# Patient Record
Sex: FEMALE | Race: BLACK OR AFRICAN AMERICAN | NOT HISPANIC OR LATINO | Employment: UNEMPLOYED | RURAL
[De-identification: names, ages, dates, MRNs, and addresses within clinical notes are randomized per-mention and may not be internally consistent; named-entity substitution may affect disease eponyms.]

---

## 2019-07-15 ENCOUNTER — HISTORICAL (OUTPATIENT)
Dept: ADMINISTRATIVE | Facility: HOSPITAL | Age: 79
End: 2019-07-15

## 2019-07-16 LAB
LAB AP CLINICAL INFORMATION: NORMAL
LAB AP DIAGNOSIS - HISTORICAL: NORMAL
LAB AP GROSS PATHOLOGY - HISTORICAL: NORMAL
LAB AP SPECIMEN SUBMITTED - HISTORICAL: NORMAL

## 2020-05-06 ENCOUNTER — HISTORICAL (OUTPATIENT)
Dept: ADMINISTRATIVE | Facility: HOSPITAL | Age: 80
End: 2020-05-06

## 2020-05-06 LAB
ALBUMIN SERPL BCP-MCNC: 3.9 G/DL (ref 3.5–5)
ALP SERPL-CCNC: 79 U/L (ref 55–142)
ALT SERPL W P-5'-P-CCNC: 9 U/L (ref 13–56)
AST SERPL W P-5'-P-CCNC: 16 U/L (ref 15–37)
BILIRUB DIRECT SERPL-MCNC: 0.1 MG/DL (ref 0–0.2)
BILIRUB SERPL-MCNC: 0.3 MG/DL (ref 0–1.2)
BUN SERPL-MCNC: 16 MG/DL (ref 7–18)
CALCIUM SERPL-MCNC: 9.3 MG/DL (ref 8.5–10.1)
CHLORIDE SERPL-SCNC: 99 MMOL/L (ref 98–107)
CO2 SERPL-SCNC: 30 MMOL/L (ref 21–32)
CREAT SERPL-MCNC: 0.86 MG/DL (ref 0.5–1.02)
EST. AVERAGE GLUCOSE BLD GHB EST-MCNC: 180 MG/DL
GLUCOSE SERPL-MCNC: 152 MG/DL (ref 74–106)
HBA1C MFR BLD HPLC: 8 % (ref 4.5–6.6)
POTASSIUM SERPL-SCNC: 3.5 MMOL/L (ref 3.5–5.1)
PROT SERPL-MCNC: 8 G/DL (ref 6.4–8.2)
SODIUM SERPL-SCNC: 138 MMOL/L (ref 136–145)

## 2020-08-05 ENCOUNTER — HISTORICAL (OUTPATIENT)
Dept: ADMINISTRATIVE | Facility: HOSPITAL | Age: 80
End: 2020-08-05

## 2020-08-05 LAB
ANION GAP SERPL CALCULATED.3IONS-SCNC: 8.9 MMOL/L (ref 7–16)
BUN SERPL-MCNC: 15 MG/DL (ref 7–18)
CALCIUM SERPL-MCNC: 9.6 MG/DL (ref 8.5–10.1)
CHLORIDE SERPL-SCNC: 102 MMOL/L (ref 98–107)
CO2 SERPL-SCNC: 33 MMOL/L (ref 21–32)
CREAT SERPL-MCNC: 0.89 MG/DL (ref 0.5–1.02)
EST. AVERAGE GLUCOSE BLD GHB EST-MCNC: 167 MG/DL
GLUCOSE SERPL-MCNC: 170 MG/DL (ref 74–106)
HBA1C MFR BLD HPLC: 7.6 %
POTASSIUM SERPL-SCNC: 3.9 MMOL/L (ref 3.5–5.1)
SARS-COV+SARS-COV-2 AG RESP QL IA.RAPID: NEGATIVE
SODIUM SERPL-SCNC: 140 MMOL/L (ref 136–145)

## 2020-08-07 LAB
REPORT: NORMAL

## 2020-09-23 ENCOUNTER — HISTORICAL (OUTPATIENT)
Dept: ADMINISTRATIVE | Facility: HOSPITAL | Age: 80
End: 2020-09-23

## 2020-09-24 LAB
ALBUMIN SERPL BCP-MCNC: 4.1 G/DL (ref 3.5–5)
ALP SERPL-CCNC: 51 U/L (ref 55–142)
ALT SERPL W P-5'-P-CCNC: 35 U/L (ref 13–56)
AST SERPL W P-5'-P-CCNC: 21 U/L (ref 15–37)
BILIRUB DIRECT SERPL-MCNC: 0.1 MG/DL (ref 0–0.2)
BILIRUB SERPL-MCNC: 0.3 MG/DL (ref 0–1.2)
CHOLEST SERPL-MCNC: 150 MG/DL
CHOLEST/HDLC SERPL: 2.8 {RATIO}
HDLC SERPL-MCNC: 54 MG/DL
LDLC SERPL CALC-MCNC: 72 MG/DL
PROT SERPL-MCNC: 7.6 G/DL (ref 6.4–8.2)
TRIGL SERPL-MCNC: 119 MG/DL

## 2021-01-02 ENCOUNTER — HISTORICAL (OUTPATIENT)
Dept: ADMINISTRATIVE | Facility: HOSPITAL | Age: 81
End: 2021-01-02

## 2021-01-02 LAB — GLUCOSE SERPL-MCNC: 119 MG/DL (ref 70–105)

## 2021-01-04 ENCOUNTER — HISTORICAL (OUTPATIENT)
Dept: ADMINISTRATIVE | Facility: HOSPITAL | Age: 81
End: 2021-01-04

## 2021-01-04 LAB
EST. AVERAGE GLUCOSE BLD GHB EST-MCNC: 160 MG/DL
HBA1C MFR BLD HPLC: 7.4 % (ref 4.5–6.6)
SARS-COV+SARS-COV-2 AG RESP QL IA.RAPID: NEGATIVE

## 2021-01-06 LAB
REPORT: NORMAL

## 2021-01-18 ENCOUNTER — HISTORICAL (OUTPATIENT)
Dept: ADMINISTRATIVE | Facility: HOSPITAL | Age: 81
End: 2021-01-18

## 2021-01-18 LAB
ANION GAP SERPL CALCULATED.3IONS-SCNC: 11 MMOL/L (ref 7–16)
BASOPHILS # BLD AUTO: 0.04 X10E3/UL (ref 0–0.2)
BASOPHILS NFR BLD AUTO: 0.7 % (ref 0–1)
BUN SERPL-MCNC: 19 MG/DL (ref 7–18)
CALCIUM SERPL-MCNC: 9.5 MG/DL (ref 8.5–10.1)
CHLORIDE SERPL-SCNC: 101 MMOL/L (ref 98–107)
CO2 SERPL-SCNC: 30 MMOL/L (ref 21–32)
CREAT SERPL-MCNC: 0.89 MG/DL (ref 0.5–1.02)
EOSINOPHIL # BLD AUTO: 0.1 X10E3/UL (ref 0–0.5)
EOSINOPHIL NFR BLD AUTO: 1.7 % (ref 1–4)
ERYTHROCYTE [DISTWIDTH] IN BLOOD BY AUTOMATED COUNT: 13.5 % (ref 11.5–14.5)
EST. AVERAGE GLUCOSE BLD GHB EST-MCNC: 157 MG/DL
GLUCOSE SERPL-MCNC: 128 MG/DL (ref 74–106)
HBA1C MFR BLD HPLC: 7.3 %
HCT VFR BLD AUTO: 37.4 % (ref 38–47)
HGB BLD-MCNC: 12.2 G/DL (ref 12–16)
IMM GRANULOCYTES # BLD AUTO: 0.02 X10E3/UL (ref 0–0.04)
IMM GRANULOCYTES NFR BLD: 0.3 % (ref 0–0.4)
LYMPHOCYTES # BLD AUTO: 2.42 X10E3/UL (ref 1–4.8)
LYMPHOCYTES NFR BLD AUTO: 39.9 % (ref 27–41)
MCH RBC QN AUTO: 30.9 PG (ref 27–31)
MCHC RBC AUTO-ENTMCNC: 32.6 G/DL (ref 32–36)
MCV RBC AUTO: 94.7 FL (ref 80–96)
MONOCYTES # BLD AUTO: 0.5 X10E3/UL (ref 0–0.8)
MONOCYTES NFR BLD AUTO: 8.3 % (ref 2–6)
MPC BLD CALC-MCNC: 11.1 FL (ref 9.4–12.4)
NEUTROPHILS # BLD AUTO: 2.98 X10E3/UL (ref 1.8–7.7)
NEUTROPHILS NFR BLD AUTO: 49.1 % (ref 53–65)
NRBC # BLD AUTO: 0 X10E3/UL (ref 0–0)
NRBC, AUTO (.00): 0 /100 (ref 0–0)
PLATELET # BLD AUTO: 305 X10E3/UL (ref 150–400)
POTASSIUM SERPL-SCNC: 3.5 MMOL/L (ref 3.5–5.1)
RBC # BLD AUTO: 3.95 X10E6/UL (ref 4.2–5.4)
SODIUM SERPL-SCNC: 138 MMOL/L (ref 136–145)
TSH SERPL DL<=0.005 MIU/L-ACNC: 4.36 UIU/ML (ref 0.36–3.74)
WBC # BLD AUTO: 6.06 X10E3/UL (ref 4.5–11)

## 2021-01-20 LAB
REPORT: NORMAL

## 2021-02-01 ENCOUNTER — HISTORICAL (OUTPATIENT)
Dept: ADMINISTRATIVE | Facility: HOSPITAL | Age: 81
End: 2021-02-01

## 2021-02-01 LAB — SARS-COV+SARS-COV-2 AG RESP QL IA.RAPID: NEGATIVE

## 2021-02-03 LAB
REPORT: NORMAL

## 2021-03-04 ENCOUNTER — HISTORICAL (OUTPATIENT)
Dept: ADMINISTRATIVE | Facility: HOSPITAL | Age: 81
End: 2021-03-04

## 2021-04-21 ENCOUNTER — HISTORICAL (OUTPATIENT)
Dept: ADMINISTRATIVE | Facility: HOSPITAL | Age: 81
End: 2021-04-21

## 2021-04-21 LAB
EST. AVERAGE GLUCOSE BLD GHB EST-MCNC: 184 MG/DL
HBA1C MFR BLD HPLC: 8.1 % (ref 4.5–6.6)

## 2022-03-10 ENCOUNTER — HOSPITAL ENCOUNTER (OUTPATIENT)
Dept: RADIOLOGY | Facility: HOSPITAL | Age: 82
Discharge: HOME | End: 2022-03-10
Payer: COMMERCIAL

## 2022-03-10 VITALS — HEIGHT: 63 IN | BODY MASS INDEX: 30.12 KG/M2 | WEIGHT: 170 LBS

## 2022-03-10 DIAGNOSIS — Z12.31 VISIT FOR SCREENING MAMMOGRAM: ICD-10-CM

## 2022-03-10 PROCEDURE — 77067 MAMMO DIGITAL SCREENING LEFT: ICD-10-PCS | Mod: 26,52,, | Performed by: RADIOLOGY

## 2022-03-10 PROCEDURE — 77067 SCR MAMMO BI INCL CAD: CPT | Mod: TC

## 2022-03-10 PROCEDURE — 77067 SCR MAMMO BI INCL CAD: CPT | Mod: 26,52,, | Performed by: RADIOLOGY

## 2023-02-18 ENCOUNTER — HOSPITAL ENCOUNTER (EMERGENCY)
Facility: HOSPITAL | Age: 83
Discharge: HOME | End: 2023-02-18
Attending: FAMILY MEDICINE
Payer: COMMERCIAL

## 2023-02-18 VITALS
DIASTOLIC BLOOD PRESSURE: 76 MMHG | BODY MASS INDEX: 29.05 KG/M2 | OXYGEN SATURATION: 95 % | HEART RATE: 70 BPM | WEIGHT: 164 LBS | RESPIRATION RATE: 18 BRPM | TEMPERATURE: 98 F | SYSTOLIC BLOOD PRESSURE: 143 MMHG

## 2023-02-18 DIAGNOSIS — K64.9 HEMORRHOIDS, UNSPECIFIED HEMORRHOID TYPE: ICD-10-CM

## 2023-02-18 DIAGNOSIS — K59.00 CONSTIPATION, UNSPECIFIED CONSTIPATION TYPE: Primary | ICD-10-CM

## 2023-02-18 LAB
ALBUMIN SERPL BCP-MCNC: 4.1 G/DL (ref 3.5–5)
ALBUMIN/GLOB SERPL: 1.2 {RATIO}
ALP SERPL-CCNC: 65 U/L (ref 55–142)
ALT SERPL W P-5'-P-CCNC: 26 U/L (ref 13–56)
ANION GAP SERPL CALCULATED.3IONS-SCNC: 15 MMOL/L (ref 7–16)
AST SERPL W P-5'-P-CCNC: 20 U/L (ref 15–37)
BASOPHILS # BLD AUTO: 0.01 K/UL (ref 0–0.2)
BASOPHILS NFR BLD AUTO: 0.1 % (ref 0–1)
BILIRUB SERPL-MCNC: 0.4 MG/DL (ref ?–1.2)
BILIRUB UR QL STRIP: NEGATIVE
BUN SERPL-MCNC: 28 MG/DL (ref 7–18)
BUN/CREAT SERPL: 22 (ref 6–20)
CALCIUM SERPL-MCNC: 9.5 MG/DL (ref 8.5–10.1)
CHLORIDE SERPL-SCNC: 101 MMOL/L (ref 98–107)
CLARITY UR: CLEAR
CO2 SERPL-SCNC: 30 MMOL/L (ref 21–32)
COLOR UR: YELLOW
CREAT SERPL-MCNC: 1.3 MG/DL (ref 0.55–1.02)
DIFFERENTIAL METHOD BLD: ABNORMAL
EGFR (NO RACE VARIABLE) (RUSH/TITUS): 42 ML/MIN/1.73M²
EOSINOPHIL # BLD AUTO: 0.09 K/UL (ref 0–0.5)
EOSINOPHIL NFR BLD AUTO: 1.2 % (ref 1–4)
ERYTHROCYTE [DISTWIDTH] IN BLOOD BY AUTOMATED COUNT: 15.1 % (ref 11.5–14.5)
GLOBULIN SER-MCNC: 3.4 G/DL (ref 2–4)
GLUCOSE SERPL-MCNC: 122 MG/DL (ref 74–106)
GLUCOSE SERPL-MCNC: 149 MG/DL (ref 70–105)
GLUCOSE UR STRIP-MCNC: 500 MG/DL
HCT VFR BLD AUTO: 39.8 % (ref 38–47)
HGB BLD-MCNC: 12.7 G/DL (ref 12–16)
IMM GRANULOCYTES # BLD AUTO: 0.01 K/UL (ref 0–0.04)
IMM GRANULOCYTES NFR BLD: 0.1 % (ref 0–0.4)
KETONES UR STRIP-SCNC: ABNORMAL MG/DL
LEUKOCYTE ESTERASE UR QL STRIP: NEGATIVE
LYMPHOCYTES # BLD AUTO: 0.83 K/UL (ref 1–4.8)
LYMPHOCYTES NFR BLD AUTO: 11.3 % (ref 27–41)
MAGNESIUM SERPL-MCNC: 1.7 MG/DL (ref 1.7–2.3)
MCH RBC QN AUTO: 29.7 PG (ref 27–31)
MCHC RBC AUTO-ENTMCNC: 31.9 G/DL (ref 32–36)
MCV RBC AUTO: 93.2 FL (ref 80–96)
MONOCYTES # BLD AUTO: 0.36 K/UL (ref 0–0.8)
MONOCYTES NFR BLD AUTO: 4.9 % (ref 2–6)
MPC BLD CALC-MCNC: 10 FL (ref 9.4–12.4)
NEUTROPHILS # BLD AUTO: 6.06 K/UL (ref 1.8–7.7)
NEUTROPHILS NFR BLD AUTO: 82.4 % (ref 53–65)
NITRITE UR QL STRIP: NEGATIVE
PH UR STRIP: 6 PH UNITS
PLATELET # BLD AUTO: 284 K/UL (ref 150–400)
POTASSIUM SERPL-SCNC: 3.9 MMOL/L (ref 3.5–5.1)
PROT SERPL-MCNC: 7.5 G/DL (ref 6.4–8.2)
PROT UR QL STRIP: NEGATIVE
RBC # BLD AUTO: 4.27 M/UL (ref 4.2–5.4)
RBC # UR STRIP: NEGATIVE /UL
SODIUM SERPL-SCNC: 142 MMOL/L (ref 136–145)
SP GR UR STRIP: 1.01
UROBILINOGEN UR STRIP-ACNC: 0.2 MG/DL
WBC # BLD AUTO: 7.36 K/UL (ref 4.5–11)

## 2023-02-18 PROCEDURE — 83735 ASSAY OF MAGNESIUM: CPT | Performed by: FAMILY MEDICINE

## 2023-02-18 PROCEDURE — 82962 GLUCOSE BLOOD TEST: CPT

## 2023-02-18 PROCEDURE — 25000003 PHARM REV CODE 250: Performed by: FAMILY MEDICINE

## 2023-02-18 PROCEDURE — 80053 COMPREHEN METABOLIC PANEL: CPT | Performed by: FAMILY MEDICINE

## 2023-02-18 PROCEDURE — 99284 EMERGENCY DEPT VISIT MOD MDM: CPT

## 2023-02-18 PROCEDURE — 99284 EMERGENCY DEPT VISIT MOD MDM: CPT | Performed by: FAMILY MEDICINE

## 2023-02-18 PROCEDURE — 85025 COMPLETE CBC W/AUTO DIFF WBC: CPT | Performed by: FAMILY MEDICINE

## 2023-02-18 PROCEDURE — 81003 URINALYSIS AUTO W/O SCOPE: CPT | Performed by: FAMILY MEDICINE

## 2023-02-18 RX ORDER — DOCUSATE SODIUM 100 MG/1
100 CAPSULE, LIQUID FILLED ORAL 2 TIMES DAILY
Qty: 60 CAPSULE | Refills: 0 | Status: SHIPPED | OUTPATIENT
Start: 2023-02-18 | End: 2023-11-20

## 2023-02-18 RX ORDER — METOPROLOL SUCCINATE 50 MG/1
50 TABLET, EXTENDED RELEASE ORAL 2 TIMES DAILY
COMMUNITY
Start: 2023-01-13

## 2023-02-18 RX ORDER — DOXYCYCLINE 100 MG/1
100 CAPSULE ORAL 2 TIMES DAILY
COMMUNITY
Start: 2023-01-30 | End: 2023-11-20

## 2023-02-18 RX ORDER — FLUOXETINE HYDROCHLORIDE 20 MG/1
20 CAPSULE ORAL
COMMUNITY
Start: 2023-01-30

## 2023-02-18 RX ORDER — FLUTICASONE PROPIONATE 50 MCG
SPRAY, SUSPENSION (ML) NASAL
COMMUNITY
Start: 2022-09-27

## 2023-02-18 RX ORDER — AMLODIPINE BESYLATE 5 MG/1
5 TABLET ORAL
COMMUNITY
Start: 2023-01-16

## 2023-02-18 RX ORDER — SYRING-NEEDL,DISP,INSUL,0.3 ML 29 G X1/2"
296 SYRINGE, EMPTY DISPOSABLE MISCELLANEOUS
Status: DISCONTINUED | OUTPATIENT
Start: 2023-02-18 | End: 2023-02-18

## 2023-02-18 RX ORDER — METFORMIN HYDROCHLORIDE 500 MG/1
1000 TABLET ORAL 2 TIMES DAILY
COMMUNITY
Start: 2023-01-13

## 2023-02-18 RX ORDER — HYDROCHLOROTHIAZIDE 12.5 MG/1
12.5 CAPSULE ORAL
COMMUNITY
Start: 2023-01-13 | End: 2023-11-20

## 2023-02-18 RX ORDER — CARBIDOPA AND LEVODOPA 25; 100 MG/1; MG/1
1 TABLET ORAL 3 TIMES DAILY
COMMUNITY
Start: 2023-01-13

## 2023-02-18 RX ORDER — LEVOCETIRIZINE DIHYDROCHLORIDE 5 MG/1
5 TABLET, FILM COATED ORAL
COMMUNITY
Start: 2022-12-20

## 2023-02-18 RX ORDER — ADHESIVE BANDAGE
30 BANDAGE TOPICAL
Status: COMPLETED | OUTPATIENT
Start: 2023-02-18 | End: 2023-02-18

## 2023-02-18 RX ORDER — EMPAGLIFLOZIN 10 MG/1
10 TABLET, FILM COATED ORAL
COMMUNITY
Start: 2023-01-13 | End: 2023-10-19

## 2023-02-18 RX ORDER — POLYETHYLENE GLYCOL 3350 17 G/17G
17 POWDER, FOR SOLUTION ORAL 2 TIMES DAILY PRN
Qty: 30 PACKET | Refills: 0 | Status: SHIPPED | OUTPATIENT
Start: 2023-02-18 | End: 2023-11-20

## 2023-02-18 RX ORDER — ESOMEPRAZOLE MAGNESIUM 40 MG/1
40 CAPSULE, DELAYED RELEASE ORAL
COMMUNITY
Start: 2023-01-13 | End: 2023-10-30 | Stop reason: ALTCHOICE

## 2023-02-18 RX ORDER — GLIPIZIDE 10 MG/1
10 TABLET, FILM COATED, EXTENDED RELEASE ORAL 2 TIMES DAILY
COMMUNITY
Start: 2022-11-21

## 2023-02-18 RX ORDER — HYDROCORTISONE 25 MG/G
OINTMENT TOPICAL 2 TIMES DAILY
Qty: 30 G | Refills: 0 | Status: SHIPPED | OUTPATIENT
Start: 2023-02-18 | End: 2023-02-23

## 2023-02-18 RX ORDER — ATORVASTATIN CALCIUM 20 MG/1
20 TABLET, FILM COATED ORAL
COMMUNITY
Start: 2023-01-13

## 2023-02-18 RX ADMIN — MAGNESIUM HYDROXIDE 2400 MG: 2400 SUSPENSION ORAL at 05:02

## 2023-02-18 NOTE — ED PROVIDER NOTES
Encounter Date: 2/18/2023       History     Chief Complaint   Patient presents with    Constipation    Back Pain     Mrs Owens is a 82-year-old female who presented to the ED complaining of constipation for 4 days.  Patient reports that she have been diagnosed with pneumonia 2 weeks ago and was taking antibiotics.  Patient denies fever, denies chest pain, shortness of breath, denies nausea vomiting or diarrhea.  Patient denies dysuria or hematuria.      Review of patient's allergies indicates:   Allergen Reactions    Asa [aspirin] Itching    Latex, natural rubber Hives    Sulfa (sulfonamide antibiotics) Itching    Pcn [penicillins] Rash     Past Medical History:   Diagnosis Date    Breast cancer     CHF (congestive heart failure)     Coronary artery disease     Depression     Diabetes mellitus     High blood cholesterol     Hypertension     Parkinson's disease      Past Surgical History:   Procedure Laterality Date    HYSTERECTOMY      MASTECTOMY Right     OOPHORECTOMY       Family History   Problem Relation Age of Onset    Breast cancer Maternal Aunt      Social History     Tobacco Use    Smoking status: Never    Smokeless tobacco: Never   Substance Use Topics    Alcohol use: Never    Drug use: Never     Review of Systems   Constitutional:  Negative for fever.   HENT:  Negative for sore throat.    Respiratory:  Negative for shortness of breath.    Cardiovascular:  Negative for chest pain.   Gastrointestinal:  Positive for constipation. Negative for nausea.   Genitourinary:  Negative for dysuria.   Musculoskeletal:  Negative for back pain.   Skin:  Negative for rash.   Neurological:  Negative for weakness.   Hematological:  Does not bruise/bleed easily.     Physical Exam     Initial Vitals [02/18/23 1706]   BP Pulse Resp Temp SpO2   (!) 133/45 81 18 98.3 °F (36.8 °C) 95 %      MAP       --         Physical Exam    Constitutional: She appears well-developed.   HENT:   Head: Normocephalic and atraumatic.   Eyes: EOM  are normal. Pupils are equal, round, and reactive to light.   Neck: Neck supple.   Cardiovascular:  Normal rate, regular rhythm and normal heart sounds.           Pulmonary/Chest: Breath sounds normal. No respiratory distress. She has no wheezes. She has no rhonchi. She has no rales.   Abdominal: Abdomen is soft. Bowel sounds are normal. She exhibits distension. There is no abdominal tenderness. There is no rebound and no guarding.   Musculoskeletal:         General: Normal range of motion.      Cervical back: Neck supple.     Neurological: She is alert and oriented to person, place, and time.   Skin: Skin is warm. Capillary refill takes less than 2 seconds.   Psychiatric: She has a normal mood and affect.       Medical Screening Exam   See Full Note    ED Course   Procedures  Labs Reviewed   COMPREHENSIVE METABOLIC PANEL - Abnormal; Notable for the following components:       Result Value    Glucose 122 (*)     BUN 28 (*)     Creatinine 1.30 (*)     BUN/Creatinine Ratio 22 (*)     eGFR 42 (*)     All other components within normal limits   URINALYSIS, REFLEX TO URINE CULTURE - Abnormal; Notable for the following components:    Glucose,  (*)     All other components within normal limits   CBC WITH DIFFERENTIAL - Abnormal; Notable for the following components:    MCHC 31.9 (*)     RDW 15.1 (*)     Neutrophils % 82.4 (*)     Lymphocytes % 11.3 (*)     Lymphocytes, Absolute 0.83 (*)     All other components within normal limits   POCT GLUCOSE MONITORING CONTINUOUS - Abnormal; Notable for the following components:    POC Glucose 149 (*)     All other components within normal limits   MAGNESIUM - Normal   CBC W/ AUTO DIFFERENTIAL    Narrative:     The following orders were created for panel order CBC Auto Differential.  Procedure                               Abnormality         Status                     ---------                               -----------         ------                     CBC with  Differential[[id number]]        Abnormal            Final result                 Please view results for these tests on the individual orders.          Imaging Results              XR ABDOMEN, ACUTE 2 OR MORE VIEWS WITH CHEST (Final result)  Result time 02/18/23 17:54:18      Final result by Favian Gonzalez MD (02/18/23 17:54:18)                   Impression:      Moderate colonic stool.      Electronically signed by: Favian Gonzalez  Date:    02/18/2023  Time:    17:54               Narrative:    EXAMINATION:  XR ABDOMEN ACUTE 2 OR MORE VIEWS WITH CHEST    CLINICAL HISTORY:  abdominal pain;    TECHNIQUE:  PA chest radiograph, supine and erect views of the abdomen    COMPARISON:  None    FINDINGS:  Surgical clips seen of the right breast and axilla.  The lungs appear clear.  There are a few air-fluid levels seen of the bowel.  Moderate degree of colonic stool.  No bowel obstruction.  No pneumoperitoneum.                                       Medications   magnesium hydroxide 400 mg/5 ml suspension 2,400 mg (2,400 mg Oral Given 2/18/23 1822)     Medical Decision Making:   Clinical Tests:   Lab Tests: Ordered and Reviewed  Radiological Study: Ordered and Reviewed  ED Management:  MDM    Patient presents for emergent evaluation of acute constipation   In the ED patient found to have acute constipation.    I ordered labs and personally reviewed them.  Labs significant for No acute abnormality  I ordered X-rays and personally reviewed them and reviewed the radiologist interpretation.  Xray significant for Increased stool burden.        Discharge MDM  I discussed the patient presentation labs X-rays,  findings with the patient  Patient was managed in the ED with po milk of magnesium  The response to treatment was adequate.    Patient was discharged in stable condition.  Detailed return precautions discussed.                  Clinical Impression:   Final diagnoses:  [K59.00] Constipation, unspecified constipation type  (Primary)  [K64.9] Hemorrhoids, unspecified hemorrhoid type        ED Disposition Condition    Discharge Stable          ED Prescriptions       Medication Sig Dispense Start Date End Date Auth. Provider    polyethylene glycol (GLYCOLAX) 17 gram PwPk Take 17 g by mouth 2 (two) times daily as needed (constipation). 30 packet 2/18/2023 -- Jacey River MD    docusate sodium (COLACE) 100 MG capsule Take 1 capsule (100 mg total) by mouth 2 (two) times daily. 60 capsule 2/18/2023 -- Jacey River MD    hydrocortisone 2.5 % ointment Apply topically 2 (two) times daily. For hemorrhoids for 5 days 30 g 2/18/2023 2/23/2023 Jacey River MD          Follow-up Information    None          Jacey River MD  02/18/23 0950

## 2023-02-18 NOTE — ED TRIAGE NOTES
Pt states she has been constipated for 2-3 days and her hemorrhoids are acting up. Pt states she is having abd pain back pain and sun side pain. Pt states pain is a 10 on 0-10 scale. Pt states she has been taking some sinus medications and has been diagnosed with pneumonia last month.

## 2023-06-27 ENCOUNTER — HOSPITAL ENCOUNTER (OUTPATIENT)
Dept: RADIOLOGY | Facility: HOSPITAL | Age: 83
Discharge: HOME | End: 2023-06-27
Attending: RADIOLOGY
Payer: COMMERCIAL

## 2023-06-27 ENCOUNTER — HOSPITAL ENCOUNTER (OUTPATIENT)
Dept: RADIOLOGY | Facility: HOSPITAL | Age: 83
Discharge: HOME | End: 2023-06-27
Payer: COMMERCIAL

## 2023-06-27 DIAGNOSIS — R92.8 ABNORMAL MAMMOGRAM: ICD-10-CM

## 2023-06-27 DIAGNOSIS — Z12.31 VISIT FOR SCREENING MAMMOGRAM: ICD-10-CM

## 2023-06-27 PROCEDURE — 77067 SCR MAMMO BI INCL CAD: CPT | Mod: 26,52,, | Performed by: RADIOLOGY

## 2023-06-27 PROCEDURE — 77067 SCR MAMMO BI INCL CAD: CPT | Mod: TC,52

## 2023-06-27 PROCEDURE — 76642 ULTRASOUND BREAST LIMITED: CPT | Mod: 26,59,RT, | Performed by: RADIOLOGY

## 2023-06-27 PROCEDURE — 76642 PR US BREAST UNILAT LIMITED: ICD-10-PCS | Mod: 26,59,RT, | Performed by: RADIOLOGY

## 2023-06-27 PROCEDURE — 76641 ULTRASOUND BREAST COMPLETE: CPT | Mod: TC,50

## 2023-06-27 PROCEDURE — 76641 ULTRASOUND BREAST COMPLETE: CPT | Mod: 26,LT,, | Performed by: RADIOLOGY

## 2023-06-27 PROCEDURE — 77067 MAMMO DIGITAL SCREENING LEFT: ICD-10-PCS | Mod: 26,52,, | Performed by: RADIOLOGY

## 2023-06-27 PROCEDURE — 76641 US BREAST BILATERAL COMPLETE: ICD-10-PCS | Mod: 26,LT,, | Performed by: RADIOLOGY

## 2023-10-19 ENCOUNTER — HOSPITAL ENCOUNTER (EMERGENCY)
Facility: HOSPITAL | Age: 83
Discharge: HOME | End: 2023-10-19
Attending: INTERNAL MEDICINE
Payer: COMMERCIAL

## 2023-10-19 VITALS
HEART RATE: 70 BPM | OXYGEN SATURATION: 100 % | SYSTOLIC BLOOD PRESSURE: 143 MMHG | WEIGHT: 153.38 LBS | TEMPERATURE: 99 F | DIASTOLIC BLOOD PRESSURE: 70 MMHG | BODY MASS INDEX: 27.18 KG/M2 | RESPIRATION RATE: 20 BRPM | HEIGHT: 63 IN

## 2023-10-19 DIAGNOSIS — K92.2 GI BLEED: Primary | ICD-10-CM

## 2023-10-19 DIAGNOSIS — K64.0 GRADE I HEMORRHOIDS: ICD-10-CM

## 2023-10-19 DIAGNOSIS — I10 HYPERTENSION, UNSPECIFIED TYPE: ICD-10-CM

## 2023-10-19 DIAGNOSIS — K59.00 CONSTIPATION, UNSPECIFIED CONSTIPATION TYPE: ICD-10-CM

## 2023-10-19 LAB
ALBUMIN SERPL BCP-MCNC: 4 G/DL (ref 3.5–5)
ALBUMIN/GLOB SERPL: 1.3 {RATIO}
ALP SERPL-CCNC: 74 U/L (ref 55–142)
ALT SERPL W P-5'-P-CCNC: 9 U/L (ref 13–56)
ANION GAP SERPL CALCULATED.3IONS-SCNC: 13 MMOL/L (ref 7–16)
APTT PPP: 26.9 SECONDS (ref 25.2–37.3)
AST SERPL W P-5'-P-CCNC: 20 U/L (ref 15–37)
BASOPHILS # BLD AUTO: 0.03 K/UL (ref 0–0.2)
BASOPHILS NFR BLD AUTO: 0.3 % (ref 0–1)
BILIRUB SERPL-MCNC: 0.6 MG/DL (ref ?–1.2)
BUN SERPL-MCNC: 18 MG/DL (ref 7–18)
BUN/CREAT SERPL: 17 (ref 6–20)
CALCIUM SERPL-MCNC: 9 MG/DL (ref 8.5–10.1)
CHLORIDE SERPL-SCNC: 103 MMOL/L (ref 98–107)
CO2 SERPL-SCNC: 27 MMOL/L (ref 21–32)
CREAT SERPL-MCNC: 1.07 MG/DL (ref 0.55–1.02)
DIFFERENTIAL METHOD BLD: ABNORMAL
EGFR (NO RACE VARIABLE) (RUSH/TITUS): 53 ML/MIN/1.73M2
EOSINOPHIL # BLD AUTO: 0.05 K/UL (ref 0–0.5)
EOSINOPHIL NFR BLD AUTO: 0.4 % (ref 1–4)
ERYTHROCYTE [DISTWIDTH] IN BLOOD BY AUTOMATED COUNT: 13.7 % (ref 11.5–14.5)
GLOBULIN SER-MCNC: 3 G/DL (ref 2–4)
GLUCOSE SERPL-MCNC: 204 MG/DL (ref 70–105)
GLUCOSE SERPL-MCNC: 208 MG/DL (ref 74–106)
HCT VFR BLD AUTO: 33.5 % (ref 38–47)
HGB BLD-MCNC: 11 G/DL (ref 12–16)
IMM GRANULOCYTES # BLD AUTO: 0.04 K/UL (ref 0–0.04)
IMM GRANULOCYTES NFR BLD: 0.4 % (ref 0–0.4)
IMM RETICS NFR: 13.8 % (ref 3–15.9)
INR BLD: 1.12
LIPASE SERPL-CCNC: 87 U/L (ref 73–393)
LYMPHOCYTES # BLD AUTO: 1.59 K/UL (ref 1–4.8)
LYMPHOCYTES NFR BLD AUTO: 14.2 % (ref 27–41)
MCH RBC QN AUTO: 30.4 PG (ref 27–31)
MCHC RBC AUTO-ENTMCNC: 32.8 G/DL (ref 32–36)
MCV RBC AUTO: 92.5 FL (ref 80–96)
MONOCYTES # BLD AUTO: 0.78 K/UL (ref 0–0.8)
MONOCYTES NFR BLD AUTO: 7 % (ref 2–6)
MPC BLD CALC-MCNC: 10.4 FL (ref 9.4–12.4)
NEUTROPHILS # BLD AUTO: 8.7 K/UL (ref 1.8–7.7)
NEUTROPHILS NFR BLD AUTO: 77.7 % (ref 53–65)
NRBC # BLD AUTO: 0 X10E3/UL
NRBC, AUTO (.00): 0 %
OCCULT BLOOD: POSITIVE
PLATELET # BLD AUTO: 218 K/UL (ref 150–400)
POTASSIUM SERPL-SCNC: 3.2 MMOL/L (ref 3.5–5.1)
PROT SERPL-MCNC: 7 G/DL (ref 6.4–8.2)
PROTHROMBIN TIME: 14.3 SECONDS (ref 11.7–14.7)
RBC # BLD AUTO: 3.62 M/UL (ref 4.2–5.4)
RETICS # AUTO: 0.06 X10E6/UL (ref 0.02–0.11)
RETICS/RBC NFR AUTO: 1.7 % (ref 0.4–2.2)
SODIUM SERPL-SCNC: 140 MMOL/L (ref 136–145)
TROPONIN I SERPL DL<=0.01 NG/ML-MCNC: 11.3 PG/ML
WBC # BLD AUTO: 11.19 K/UL (ref 4.5–11)

## 2023-10-19 PROCEDURE — 84484 ASSAY OF TROPONIN QUANT: CPT | Performed by: INTERNAL MEDICINE

## 2023-10-19 PROCEDURE — 83690 ASSAY OF LIPASE: CPT | Performed by: INTERNAL MEDICINE

## 2023-10-19 PROCEDURE — 85025 COMPLETE CBC W/AUTO DIFF WBC: CPT | Performed by: INTERNAL MEDICINE

## 2023-10-19 PROCEDURE — 85730 THROMBOPLASTIN TIME PARTIAL: CPT | Performed by: INTERNAL MEDICINE

## 2023-10-19 PROCEDURE — 80053 COMPREHEN METABOLIC PANEL: CPT | Performed by: INTERNAL MEDICINE

## 2023-10-19 PROCEDURE — 93010 ELECTROCARDIOGRAM REPORT: CPT | Mod: ,,, | Performed by: INTERNAL MEDICINE

## 2023-10-19 PROCEDURE — 85045 AUTOMATED RETICULOCYTE COUNT: CPT | Performed by: INTERNAL MEDICINE

## 2023-10-19 PROCEDURE — 85610 PROTHROMBIN TIME: CPT | Performed by: INTERNAL MEDICINE

## 2023-10-19 PROCEDURE — 93005 ELECTROCARDIOGRAM TRACING: CPT

## 2023-10-19 PROCEDURE — 82272 OCCULT BLD FECES 1-3 TESTS: CPT | Performed by: INTERNAL MEDICINE

## 2023-10-19 PROCEDURE — 99284 EMERGENCY DEPT VISIT MOD MDM: CPT | Performed by: INTERNAL MEDICINE

## 2023-10-19 PROCEDURE — 82962 GLUCOSE BLOOD TEST: CPT

## 2023-10-19 PROCEDURE — 99285 EMERGENCY DEPT VISIT HI MDM: CPT | Mod: 25

## 2023-10-19 PROCEDURE — 93010 EKG 12-LEAD: ICD-10-PCS | Mod: ,,, | Performed by: INTERNAL MEDICINE

## 2023-10-19 PROCEDURE — 25000003 PHARM REV CODE 250: Performed by: INTERNAL MEDICINE

## 2023-10-19 RX ORDER — DAPAGLIFLOZIN 10 MG/1
10 TABLET, FILM COATED ORAL
COMMUNITY
Start: 2023-10-12

## 2023-10-19 RX ORDER — MOMETASONE FUROATE 50 UG/1
2 SPRAY, METERED NASAL
COMMUNITY
Start: 2023-10-12 | End: 2023-10-19

## 2023-10-19 RX ORDER — LEVOTHYROXINE SODIUM 88 UG/1
88 TABLET ORAL
COMMUNITY
Start: 2023-09-25 | End: 2023-11-22

## 2023-10-19 RX ORDER — POTASSIUM CHLORIDE 750 MG/1
10 TABLET, EXTENDED RELEASE ORAL
Status: COMPLETED | OUTPATIENT
Start: 2023-10-19 | End: 2023-10-19

## 2023-10-19 RX ADMIN — POTASSIUM CHLORIDE 10 MEQ: 750 TABLET, FILM COATED, EXTENDED RELEASE ORAL at 09:10

## 2023-10-19 NOTE — ED NOTES
Call placed to [facility] referral line-spoke with real appt with tali mars made for 10/30/23 @2pm-cardiology 11/20/23 dr anthony and will be placed on wait list for closer opening-discussed appt with son

## 2023-10-19 NOTE — ED PROVIDER NOTES
Encounter Date: 10/19/2023       History     Chief Complaint   Patient presents with    Rectal Bleeding     C/o rectal bleeding starting last night     Patient comes in complaining of rectal bleeding that started earlier this morning.  Patient said she was constipated and she took MiraLax on yesterday and had a bowel movement earlier this morning.  Prior to that she had some bleeding from she thought was hemorrhoids but she started having bleeding later on this morning.  She denies abdominal pain, nausea vomiting upper GI bleed.  She has a history of constipation.  She states she had a workup by GI over 5 years ago that was negative.        Review of patient's allergies indicates:   Allergen Reactions    Asa [aspirin] Itching    Latex, natural rubber Hives    Sulfa (sulfonamide antibiotics) Itching    Pcn [penicillins] Rash     Past Medical History:   Diagnosis Date    Breast cancer     CHF (congestive heart failure)     Coronary artery disease     Depression     Diabetes mellitus     High blood cholesterol     Hypertension     Parkinson's disease      Past Surgical History:   Procedure Laterality Date    HYSTERECTOMY      MASTECTOMY Right     OOPHORECTOMY       Family History   Problem Relation Age of Onset    Breast cancer Paternal Aunt      Social History     Tobacco Use    Smoking status: Never    Smokeless tobacco: Never   Substance Use Topics    Alcohol use: Never    Drug use: Never     Review of Systems   Constitutional:  Negative for fever.   HENT:  Negative for sore throat.    Respiratory:  Negative for shortness of breath.    Cardiovascular:  Negative for chest pain.   Gastrointestinal:  Negative for nausea.   Genitourinary:  Negative for dysuria.   Musculoskeletal:  Negative for back pain.   Skin:  Negative for rash.   Neurological:  Negative for weakness.   Hematological:  Does not bruise/bleed easily.       Physical Exam     Initial Vitals [10/19/23 0806]   BP Pulse Resp Temp SpO2   (!) 147/62 70 20  99.1 °F (37.3 °C) 100 %      MAP       --         Physical Exam    Vitals reviewed.  Constitutional: She appears well-developed.   Eyes: Pupils are equal, round, and reactive to light.   Neck:   Normal range of motion.  Cardiovascular:  Normal rate.           Pulmonary/Chest: Breath sounds normal.   Abdominal: Abdomen is soft and flat. Bowel sounds are normal. There is no abdominal tenderness.   Rectal exam shows some mild tenderness but no external hemorrhoids or active bleeding.  Internally she has some mucus but no gross blood or melena and no impaction.   Genitourinary: Rectum:      Tenderness present.     Musculoskeletal:         General: Normal range of motion.      Cervical back: Normal range of motion.     Neurological: She is alert.   Skin: Skin is warm.   Psychiatric: She has a normal mood and affect.         Medical Screening Exam   See Full Note    ED Course   Procedures  Labs Reviewed   COMPREHENSIVE METABOLIC PANEL - Abnormal; Notable for the following components:       Result Value    Potassium 3.2 (*)     Glucose 208 (*)     Creatinine 1.07 (*)     ALT 9 (*)     eGFR 53 (*)     All other components within normal limits   CBC WITH DIFFERENTIAL - Abnormal; Notable for the following components:    WBC 11.19 (*)     RBC 3.62 (*)     Hemoglobin 11.0 (*)     Hematocrit 33.5 (*)     Neutrophils % 77.7 (*)     Lymphocytes % 14.2 (*)     Monocytes % 7.0 (*)     Eosinophils % 0.4 (*)     Neutrophils, Abs 8.70 (*)     All other components within normal limits   OCCULT BLOOD X 1, STOOL - Abnormal; Notable for the following components:    Occult Blood Positive (*)     All other components within normal limits   POCT GLUCOSE MONITORING CONTINUOUS - Abnormal; Notable for the following components:    POC Glucose 204 (*)     All other components within normal limits   PROTIME-INR - Normal   APTT - Normal   LIPASE - Normal   RETICULOCYTES - Normal   TROPONIN I - Normal   CBC W/ AUTO DIFFERENTIAL    Narrative:     The  following orders were created for panel order CBC auto differential.  Procedure                               Abnormality         Status                     ---------                               -----------         ------                     CBC with Differential[[id number]]        Abnormal            Final result                 Please view results for these tests on the individual orders.     EKG Readings: (Independently Interpreted)   Initial Reading: No STEMI. Rhythm: Normal Sinus Rhythm. Heart Rate: Sixty-six. Ectopy: No Ectopy. Conduction: Normal. ST Segments: Normal ST Segments. T Waves: Normal. T Waves Flipped: V3, V4 and V5. Axis: Left Axis Deviation. Clinical Impression: Normal Sinus Rhythm and Left Ventricular Hypertrophy (LDH)   Normal sinus rhythm heart rate of 66 with inverted T-waves in V3 4 5 ischemia versus LVH       Imaging Results              CT Abdomen Pelvis  Without Contrast (Final result)  Result time 10/19/23 08:51:48      Final result by Favian Gonzalez MD (10/19/23 08:51:48)                   Impression:      No acute abnormality identified in the abdomen or pelvis.    Mildly enlarged left inguinal lymph node is nonspecific.      Electronically signed by: Favian Gonzalez  Date:    10/19/2023  Time:    08:51               Narrative:    EXAMINATION:  CT ABDOMEN PELVIS WITHOUT CONTRAST    CLINICAL HISTORY:  GI bleed;    TECHNIQUE:  Low dose axial images, sagittal and coronal reformations were obtained from the lung bases to the pubic symphysis.  Oral contrast was not administered. The CT examination was performed using one or more of the following dose reduction techniques: Automated exposure control, adjustment of the mA and kV according to patient's size, use of acute or iterative reconstruction techniques.    COMPARISON:  None    FINDINGS:  Lung bases are clear.    No focal hepatic lesion.  Gallbladder, adrenals, spleen, and pancreas appear normal.  Simple cyst of the right kidney.  No renal  stone.  No hydronephrosis of either kidney.    No pneumoperitoneum.  No ascites.  Mild degree of colonic stool.  No bowel obstruction.  Appendix normal.  No inflammatory changes about the bowel identified.    There are a few mildly prominent left inguinal lymph nodes that measure up to a 1.3 cm.  No other enlarged lymph nodes seen of the abdomen or pelvis.    A few aortic and iliac calcifications.  Urinary bladder unremarkable.  No adnexal lesion.    No acute fracture.                                       Medications   potassium chloride CR tablet 10 mEq (has no administration in time range)     Medical Decision Making  Patient with lower rectal bleeding and constipation, rule out obstruction, rule out diverticular disease, hemorrhoidal bleeding severe anemia.    Amount and/or Complexity of Data Reviewed  Labs: ordered. Decision-making details documented in ED Course.  Radiology: ordered. Decision-making details documented in ED Course.  ECG/medicine tests:  Decision-making details documented in ED Course.  Discussion of management or test interpretation with external provider(s): Patient came in for in blood in her stools what she thought was from hemorrhoidal bleeding and constipation.  History of constipation in the past.  Exam shows no significant change in CBC from past years, reticulocyte count was normal suggest no active bleeding.  Her BUN creatinine also normal suggest no active bleeding.  And on the exam this blood shows no active bleeding but just liquid mucus.  Suspect there is some internal hemorrhoids but not significant external hemorrhoids.  Patient's EKG shows some inferior leads in the lateral but she has no history any cardiac ischemia, troponin is negative, advised will also need referral to Cardiology for abnormal EKG and because of the risk factors diabetic.  Will also refer the patient to GI for evaluation of the GI bleeding and constipation as an outpatient basis at this  point.    Risk  Prescription drug management.               ED Course as of 10/19/23 0906   Thu Oct 19, 2023   0825 CBC auto differential(!) [PW]   0837 Comprehensive metabolic panel(!) [PW]   0845 Retic: 1.7 [PW]   0846 Immature Retic Fraction: 13.8 [PW]   0846 Lipase: 87 [PW]   0850 Protime: 14.3 [PW]   0850 INR: 1.12 [PW]   0850 aPTT: 26.9 [PW]   0854 Troponin I High Sensitivity: 11.3 [PW]   0856 Occult blood x 1, stool(!) [PW]   0856 Occult Blood(!): Positive [PW]   0857 CT Abdomen Pelvis  Without Contrast [PW]      ED Course User Index  [PW] Benjamin Malik MD                      Clinical Impression:   Final diagnoses:  [K92.2] GI bleed (Primary)  [K64.0] Grade I hemorrhoids  [I10] Hypertension, unspecified type  [K59.00] Constipation, unspecified constipation type        ED Disposition Condition    Discharge Stable          ED Prescriptions    None       Follow-up Information       Follow up With Specialties Details Why Contact Info    Ingrid Sharp MD Family Medicine In 1 week  [id number]  [facility]   [place] [id number] [phone]               Benjamin Malik MD  10/19/23 0906

## 2023-10-30 ENCOUNTER — OFFICE VISIT (OUTPATIENT)
Dept: GASTROENTEROLOGY | Facility: CLINIC | Age: 83
End: 2023-10-30
Payer: COMMERCIAL

## 2023-10-30 VITALS
BODY MASS INDEX: 27.11 KG/M2 | HEART RATE: 62 BPM | SYSTOLIC BLOOD PRESSURE: 150 MMHG | HEIGHT: 63 IN | RESPIRATION RATE: 18 BRPM | OXYGEN SATURATION: 98 % | WEIGHT: 153 LBS | DIASTOLIC BLOOD PRESSURE: 62 MMHG

## 2023-10-30 DIAGNOSIS — K59.00 CONSTIPATION, UNSPECIFIED CONSTIPATION TYPE: ICD-10-CM

## 2023-10-30 DIAGNOSIS — D64.9 ANEMIA, UNSPECIFIED TYPE: ICD-10-CM

## 2023-10-30 DIAGNOSIS — R19.5 OCCULT BLOOD POSITIVE STOOL: Primary | ICD-10-CM

## 2023-10-30 DIAGNOSIS — R10.13 EPIGASTRIC PAIN: ICD-10-CM

## 2023-10-30 PROCEDURE — 1101F PT FALLS ASSESS-DOCD LE1/YR: CPT | Mod: CPTII,,, | Performed by: NURSE PRACTITIONER

## 2023-10-30 PROCEDURE — 1160F RVW MEDS BY RX/DR IN RCRD: CPT | Mod: CPTII,,, | Performed by: NURSE PRACTITIONER

## 2023-10-30 PROCEDURE — 1125F PR PAIN SEVERITY QUANTIFIED, PAIN PRESENT: ICD-10-PCS | Mod: CPTII,,, | Performed by: NURSE PRACTITIONER

## 2023-10-30 PROCEDURE — 3078F DIAST BP <80 MM HG: CPT | Mod: CPTII,,, | Performed by: NURSE PRACTITIONER

## 2023-10-30 PROCEDURE — 99214 OFFICE O/P EST MOD 30 MIN: CPT | Mod: S$PBB,,, | Performed by: NURSE PRACTITIONER

## 2023-10-30 PROCEDURE — 3288F PR FALLS RISK ASSESSMENT DOCUMENTED: ICD-10-PCS | Mod: CPTII,,, | Performed by: NURSE PRACTITIONER

## 2023-10-30 PROCEDURE — 3077F PR MOST RECENT SYSTOLIC BLOOD PRESSURE >= 140 MM HG: ICD-10-PCS | Mod: CPTII,,, | Performed by: NURSE PRACTITIONER

## 2023-10-30 PROCEDURE — 99214 PR OFFICE/OUTPT VISIT, EST, LEVL IV, 30-39 MIN: ICD-10-PCS | Mod: S$PBB,,, | Performed by: NURSE PRACTITIONER

## 2023-10-30 PROCEDURE — 3288F FALL RISK ASSESSMENT DOCD: CPT | Mod: CPTII,,, | Performed by: NURSE PRACTITIONER

## 2023-10-30 PROCEDURE — 1160F PR REVIEW ALL MEDS BY PRESCRIBER/CLIN PHARMACIST DOCUMENTED: ICD-10-PCS | Mod: CPTII,,, | Performed by: NURSE PRACTITIONER

## 2023-10-30 PROCEDURE — 1159F PR MEDICATION LIST DOCUMENTED IN MEDICAL RECORD: ICD-10-PCS | Mod: CPTII,,, | Performed by: NURSE PRACTITIONER

## 2023-10-30 PROCEDURE — 3078F PR MOST RECENT DIASTOLIC BLOOD PRESSURE < 80 MM HG: ICD-10-PCS | Mod: CPTII,,, | Performed by: NURSE PRACTITIONER

## 2023-10-30 PROCEDURE — 3077F SYST BP >= 140 MM HG: CPT | Mod: CPTII,,, | Performed by: NURSE PRACTITIONER

## 2023-10-30 PROCEDURE — 1101F PR PT FALLS ASSESS DOC 0-1 FALLS W/OUT INJ PAST YR: ICD-10-PCS | Mod: CPTII,,, | Performed by: NURSE PRACTITIONER

## 2023-10-30 PROCEDURE — 1159F MED LIST DOCD IN RCRD: CPT | Mod: CPTII,,, | Performed by: NURSE PRACTITIONER

## 2023-10-30 PROCEDURE — 99215 OFFICE O/P EST HI 40 MIN: CPT | Mod: PBBFAC | Performed by: NURSE PRACTITIONER

## 2023-10-30 PROCEDURE — 1125F AMNT PAIN NOTED PAIN PRSNT: CPT | Mod: CPTII,,, | Performed by: NURSE PRACTITIONER

## 2023-10-30 RX ORDER — PANTOPRAZOLE SODIUM 40 MG/1
40 TABLET, DELAYED RELEASE ORAL DAILY
Qty: 30 TABLET | Refills: 2 | Status: SHIPPED | OUTPATIENT
Start: 2023-10-30 | End: 2023-12-27

## 2023-10-30 NOTE — PROGRESS NOTES
Opal Owens is a 82 y.o. female here for GI Bleeding        PCP: Ingrid Sharp  Referring Provider: Benjamin Malik Md  [address] [facility] [place] [place] [id number]     HPI:  Presents due to report of blood in stool.  Patient reports that she did see bright red blood in her stool on one occasion.  Reports that she felt that this was coming from a hemorrhoid but was unsure.  Patient went to the emergency room on 10/19/2023.  Stool was positive for occult blood. Hgb 11.0 and Hct 33.5.  Lipase was normal.  Reports that last colonoscopy was greater than 5 years ago.  HA is down and I am unable to review last colonoscopy.  Reports that last colonoscopy was performed by Dr. Grover.  Does report a history of colon polyps.  Is also reporting epigastric pain.  Epigastric tenderness not necessarily related to eating.  She has been taking Nexium for many years.  We will stop Nexium and try Protonix.  Is not taking NSAIDs.          ROS:  Review of Systems   Constitutional:  Negative for appetite change, fatigue, fever and unexpected weight change.   HENT:  Negative for trouble swallowing.    Cardiovascular:  Negative for chest pain.   Gastrointestinal:  Positive for blood in stool and constipation. Negative for abdominal pain, anal bleeding, change in bowel habit, diarrhea, nausea, rectal pain, vomiting and reflux.   Musculoskeletal:  Negative for gait problem.   Integumentary:  Negative for pallor.   Psychiatric/Behavioral:  The patient is not nervous/anxious.           PMHX:  has a past medical history of Breast cancer, CHF (congestive heart failure), Coronary artery disease, Depression, Diabetes mellitus, High blood cholesterol, Hypertension, and Parkinson's disease.    PSHX:  has a past surgical history that includes Hysterectomy; Mastectomy (Right); and Oophorectomy.    PFHX: family history includes Breast cancer in her paternal aunt.    PSlHX:  reports  that she has never smoked. She has never used smokeless tobacco. She reports that she does not drink alcohol and does not use drugs.        Review of patient's allergies indicates:   Allergen Reactions    Asa [aspirin] Itching    Latex, natural rubber Hives    Sulfa (sulfonamide antibiotics) Itching    Pcn [penicillins] Rash       Medication List with Changes/Refills   New Medications    PANTOPRAZOLE (PROTONIX) 40 MG TABLET    Take 1 tablet (40 mg total) by mouth once daily.   Current Medications    AMLODIPINE (NORVASC) 5 MG TABLET    Take 5 mg by mouth.    ATORVASTATIN (LIPITOR) 20 MG TABLET    Take 20 mg by mouth.    CARBIDOPA-LEVODOPA  MG (SINEMET)  MG PER TABLET    Take 1 tablet by mouth 3 (three) times daily.    DOCUSATE SODIUM (COLACE) 100 MG CAPSULE    Take 1 capsule (100 mg total) by mouth 2 (two) times daily.    DOXYCYCLINE (VIBRAMYCIN) 100 MG CAP    Take 100 mg by mouth 2 (two) times daily.    FARXIGA 10 MG TABLET    Take 10 mg by mouth.    FLUOXETINE 20 MG CAPSULE    Take 20 mg by mouth.    FLUTICASONE PROPIONATE (FLONASE) 50 MCG/ACTUATION NASAL SPRAY    by Each Nostril route.    GLIPIZIDE (GLUCOTROL) 10 MG TR24    Take 10 mg by mouth 2 (two) times daily.    HYDROCHLOROTHIAZIDE (MICROZIDE) 12.5 MG CAPSULE    Take 12.5 mg by mouth.    HYDROCORTISONE 2.5 % OINTMENT    Apply topically 2 (two) times daily. For hemorrhoids for 5 days    LEVOCETIRIZINE (XYZAL) 5 MG TABLET    Take 5 mg by mouth.    LEVOTHYROXINE (SYNTHROID) 88 MCG TABLET    Take 88 mcg by mouth.    METFORMIN (GLUCOPHAGE) 500 MG TABLET    Take 1,000 mg by mouth 2 (two) times daily.    METOPROLOL SUCCINATE (TOPROL-XL) 50 MG 24 HR TABLET    Take 50 mg by mouth 2 (two) times daily.    POLYETHYLENE GLYCOL (GLYCOLAX) 17 GRAM PWPK    Take 17 g by mouth 2 (two) times daily as needed (constipation).   Discontinued Medications    ESOMEPRAZOLE (NEXIUM) 40 MG CAPSULE    Take 40 mg by mouth.        Objective Findings:  Vital Signs:  BP (!) 150/62  "  Pulse 62   Resp 18   Ht 5' 3" (1.6 m)   Wt 69.4 kg (153 lb)   SpO2 98%   BMI 27.10 kg/m²  Body mass index is 27.1 kg/m².    Physical Exam:  Physical Exam  Vitals and nursing note reviewed.   Constitutional:       General: She is not in acute distress.     Appearance: Normal appearance.   HENT:      Mouth/Throat:      Mouth: Mucous membranes are moist.   Cardiovascular:      Rate and Rhythm: Normal rate.   Pulmonary:      Effort: Pulmonary effort is normal.      Breath sounds: No wheezing, rhonchi or rales.   Abdominal:      General: Bowel sounds are normal. There is no distension.      Palpations: Abdomen is soft. There is no mass.      Tenderness: There is no abdominal tenderness. There is no guarding.      Hernia: No hernia is present.   Skin:     General: Skin is warm and dry.      Coloration: Skin is not jaundiced or pale.   Neurological:      Mental Status: She is alert and oriented to person, place, and time.   Psychiatric:         Mood and Affect: Mood normal.          Labs:  Lab Results   Component Value Date    WBC 11.19 (H) 10/19/2023    HGB 11.0 (L) 10/19/2023    HCT 33.5 (L) 10/19/2023    MCV 92.5 10/19/2023    RDW 13.7 10/19/2023     10/19/2023    LYMPH 14.2 (L) 10/19/2023    LYMPH 1.59 10/19/2023    MONO 7.0 (H) 10/19/2023    EOS 0.05 10/19/2023    BASO 0.03 10/19/2023     Lab Results   Component Value Date     10/19/2023    K 3.2 (L) 10/19/2023     10/19/2023    CO2 27 10/19/2023     (H) 10/19/2023    BUN 18 10/19/2023    CREATININE 1.07 (H) 10/19/2023    CALCIUM 9.0 10/19/2023    PROT 7.0 10/19/2023    ALBUMIN 4.0 10/19/2023    BILITOT 0.6 10/19/2023    ALKPHOS 74 10/19/2023    AST 20 10/19/2023    ALT 9 (L) 10/19/2023         Imaging: CT Abdomen Pelvis  Without Contrast    Result Date: 10/19/2023  EXAMINATION: CT ABDOMEN PELVIS WITHOUT CONTRAST CLINICAL HISTORY: GI bleed; TECHNIQUE: Low dose axial images, sagittal and coronal reformations were obtained from the lung " bases to the pubic symphysis.  Oral contrast was not administered. The CT examination was performed using one or more of the following dose reduction techniques: Automated exposure control, adjustment of the mA and kV according to patient's size, use of acute or iterative reconstruction techniques. COMPARISON: None FINDINGS: Lung bases are clear. No focal hepatic lesion.  Gallbladder, adrenals, spleen, and pancreas appear normal.  Simple cyst of the right kidney.  No renal stone.  No hydronephrosis of either kidney. No pneumoperitoneum.  No ascites.  Mild degree of colonic stool.  No bowel obstruction.  Appendix normal.  No inflammatory changes about the bowel identified. There are a few mildly prominent left inguinal lymph nodes that measure up to a 1.3 cm.  No other enlarged lymph nodes seen of the abdomen or pelvis. A few aortic and iliac calcifications.  Urinary bladder unremarkable.  No adnexal lesion. No acute fracture.     No acute abnormality identified in the abdomen or pelvis. Mildly enlarged left inguinal lymph node is nonspecific. Electronically signed by: Favian Gonzalez Date:    10/19/2023 Time:    08:51        Assessment:  Oapl Owens is a 82 y.o. female here with:  1. Occult blood positive stool    2. Constipation, unspecified constipation type    3. Epigastric pain    4. Anemia, unspecified type          Recommendations:  1. Schedule EGD  Protonix 40 mg daily. Stop Nexium. Avoid NSAID's  2. Schedule colonoscopy  3. Miralax powder 17 g twice daily in 8 oz of liquid.  Milk of magnesia every 3rd day  4. Iron studies    Follow up in about 3 months (around 1/30/2024).      Order summary:  Orders Placed This Encounter    Iron and TIBC    Ferritin    pantoprazole (PROTONIX) 40 MG tablet    Colonoscopy    EGD       Thank you for allowing me to participate in the care of Opal Owens.      BK Priest

## 2023-10-30 NOTE — PROGRESS NOTES
Pt still not in lobby. Attempted to call pt's phone after confirming pt is not in lobby downstairs. No answer after ringing. No contacts listed in chart.

## 2023-10-30 NOTE — PROGRESS NOTES
Called patient from lobby. No response. No one in the lobby appears to look like her and no one in bathroom.

## 2023-11-20 ENCOUNTER — OFFICE VISIT (OUTPATIENT)
Dept: CARDIOLOGY | Facility: CLINIC | Age: 83
End: 2023-11-20
Payer: COMMERCIAL

## 2023-11-20 VITALS
SYSTOLIC BLOOD PRESSURE: 110 MMHG | HEART RATE: 67 BPM | BODY MASS INDEX: 27.29 KG/M2 | DIASTOLIC BLOOD PRESSURE: 70 MMHG | RESPIRATION RATE: 18 BRPM | HEIGHT: 63 IN | WEIGHT: 154 LBS

## 2023-11-20 DIAGNOSIS — R10.13 EPIGASTRIC PAIN: ICD-10-CM

## 2023-11-20 DIAGNOSIS — I10 HYPERTENSION, UNSPECIFIED TYPE: ICD-10-CM

## 2023-11-20 DIAGNOSIS — R10.13 EPIGASTRIC PAIN: Primary | ICD-10-CM

## 2023-11-20 DIAGNOSIS — R07.9 CHEST PAIN, UNSPECIFIED TYPE: Primary | ICD-10-CM

## 2023-11-20 DIAGNOSIS — E89.0 POSTOPERATIVE HYPOTHYROIDISM: ICD-10-CM

## 2023-11-20 DIAGNOSIS — R19.5 OCCULT BLOOD POSITIVE STOOL: ICD-10-CM

## 2023-11-20 PROCEDURE — 1159F MED LIST DOCD IN RCRD: CPT | Mod: CPTII,,, | Performed by: STUDENT IN AN ORGANIZED HEALTH CARE EDUCATION/TRAINING PROGRAM

## 2023-11-20 PROCEDURE — 93005 ELECTROCARDIOGRAM TRACING: CPT | Mod: PBBFAC | Performed by: STUDENT IN AN ORGANIZED HEALTH CARE EDUCATION/TRAINING PROGRAM

## 2023-11-20 PROCEDURE — 3074F SYST BP LT 130 MM HG: CPT | Mod: CPTII,,, | Performed by: STUDENT IN AN ORGANIZED HEALTH CARE EDUCATION/TRAINING PROGRAM

## 2023-11-20 PROCEDURE — 99204 PR OFFICE/OUTPT VISIT, NEW, LEVL IV, 45-59 MIN: ICD-10-PCS | Mod: S$PBB,,, | Performed by: STUDENT IN AN ORGANIZED HEALTH CARE EDUCATION/TRAINING PROGRAM

## 2023-11-20 PROCEDURE — 93010 EKG 12-LEAD: ICD-10-PCS | Mod: S$PBB,,, | Performed by: STUDENT IN AN ORGANIZED HEALTH CARE EDUCATION/TRAINING PROGRAM

## 2023-11-20 PROCEDURE — 1159F PR MEDICATION LIST DOCUMENTED IN MEDICAL RECORD: ICD-10-PCS | Mod: CPTII,,, | Performed by: STUDENT IN AN ORGANIZED HEALTH CARE EDUCATION/TRAINING PROGRAM

## 2023-11-20 PROCEDURE — 99215 OFFICE O/P EST HI 40 MIN: CPT | Mod: PBBFAC | Performed by: STUDENT IN AN ORGANIZED HEALTH CARE EDUCATION/TRAINING PROGRAM

## 2023-11-20 PROCEDURE — 3074F PR MOST RECENT SYSTOLIC BLOOD PRESSURE < 130 MM HG: ICD-10-PCS | Mod: CPTII,,, | Performed by: STUDENT IN AN ORGANIZED HEALTH CARE EDUCATION/TRAINING PROGRAM

## 2023-11-20 PROCEDURE — 3078F DIAST BP <80 MM HG: CPT | Mod: CPTII,,, | Performed by: STUDENT IN AN ORGANIZED HEALTH CARE EDUCATION/TRAINING PROGRAM

## 2023-11-20 PROCEDURE — 99204 OFFICE O/P NEW MOD 45 MIN: CPT | Mod: S$PBB,,, | Performed by: STUDENT IN AN ORGANIZED HEALTH CARE EDUCATION/TRAINING PROGRAM

## 2023-11-20 PROCEDURE — 3078F PR MOST RECENT DIASTOLIC BLOOD PRESSURE < 80 MM HG: ICD-10-PCS | Mod: CPTII,,, | Performed by: STUDENT IN AN ORGANIZED HEALTH CARE EDUCATION/TRAINING PROGRAM

## 2023-11-20 PROCEDURE — 93010 ELECTROCARDIOGRAM REPORT: CPT | Mod: S$PBB,,, | Performed by: STUDENT IN AN ORGANIZED HEALTH CARE EDUCATION/TRAINING PROGRAM

## 2023-11-20 NOTE — PROGRESS NOTES
PCP: Ingrid Sharp MD    Referring Provider:     Subjective:   Opal Owens is a 83 y.o. female with hx of DM, Hypothyroid, FOBT +ve, HTN who presents for evaluation of HTN and chest pain.     Patient recently seen by GI for epigastric pain. Nexium was switched to protonix. Reports minimal improvement. Reports mid chest pain (could not describe). Worse with constipation. Taking miralax. Last for few mins. Occurs every day.     Fhx: non-contributary  Shx: Denies smoking, etoh or drug use    EKG - 11/20/23 - NSR, GLORIA, LVH with ST abnl.      Lab Results   Component Value Date     10/19/2023    K 3.2 (L) 10/19/2023     10/19/2023    CO2 27 10/19/2023    BUN 18 10/19/2023    CREATININE 1.07 (H) 10/19/2023    CALCIUM 9.0 10/19/2023    ANIONGAP 13 10/19/2023    ESTGFRAFRICA 79 01/18/2021    EGFRNONAA 59 (L) 05/23/2022       Lab Results   Component Value Date    CHOL 131 10/19/2021    CHOL 229 (H) 07/20/2021    CHOL 174 05/17/2021     Lab Results   Component Value Date    HDL 62 (H) 10/19/2021    HDL 64 (H) 07/20/2021    HDL 61 (H) 05/17/2021     Lab Results   Component Value Date    LDLCALC 47 10/19/2021    LDLCALC 141 07/20/2021    LDLCALC 93 05/17/2021     Lab Results   Component Value Date    TRIG 111 10/19/2021    TRIG 120 07/20/2021    TRIG 98 05/17/2021     Lab Results   Component Value Date    CHOLHDL 2.1 10/19/2021    CHOLHDL 3.6 07/20/2021    CHOLHDL 2.9 05/17/2021       Lab Results   Component Value Date    WBC 11.19 (H) 10/19/2023    HGB 11.0 (L) 10/19/2023    HCT 33.5 (L) 10/19/2023    MCV 92.5 10/19/2023     10/19/2023           Current Outpatient Medications:     amLODIPine (NORVASC) 5 MG tablet, Take 5 mg by mouth., Disp: , Rfl:     atorvastatin (LIPITOR) 20 MG tablet, Take 20 mg by mouth., Disp: , Rfl:     FARXIGA 10 mg tablet, Take 10 mg by mouth., Disp: , Rfl:     FLUoxetine 20 MG capsule, Take 20 mg by mouth., Disp: , Rfl:     fluticasone propionate (FLONASE) 50 mcg/actuation nasal  "spray, by Each Nostril route., Disp: , Rfl:     levocetirizine (XYZAL) 5 MG tablet, Take 5 mg by mouth., Disp: , Rfl:     levothyroxine (SYNTHROID) 88 MCG tablet, Take 88 mcg by mouth., Disp: , Rfl:     metFORMIN (GLUCOPHAGE) 500 MG tablet, Take 1,000 mg by mouth 2 (two) times daily., Disp: , Rfl:     metoprolol succinate (TOPROL-XL) 50 MG 24 hr tablet, Take 50 mg by mouth 2 (two) times daily., Disp: , Rfl:     pantoprazole (PROTONIX) 40 MG tablet, Take 1 tablet (40 mg total) by mouth once daily., Disp: 30 tablet, Rfl: 2    carbidopa-levodopa  mg (SINEMET)  mg per tablet, Take 1 tablet by mouth 3 (three) times daily., Disp: , Rfl:     glipiZIDE (GLUCOTROL) 10 MG TR24, Take 10 mg by mouth 2 (two) times daily., Disp: , Rfl:     hydrocortisone 2.5 % ointment, Apply topically 2 (two) times daily. For hemorrhoids for 5 days, Disp: 30 g, Rfl: 0    ROS      Objective:   /70   Pulse 67   Resp 18   Ht 5' 3" (1.6 m)   Wt 69.9 kg (154 lb)   BMI 27.28 kg/m²     Physical Exam      Assessment:     1. Chest pain, unspecified type  Echo    NM Myocardial Perfusion Spect Multi Pharmacologic    Nuclear Stress Test      2. Hypertension, unspecified type  Ambulatory referral/consult to Cardiology      3. Postoperative hypothyroidism  TSH      4. Epigastric pain        5. Occult blood positive stool              Plan:   Epigastric pain  Atypical - not improving with PPI  Given DM and Age will plan for lexiscan  On CCB and BB    Occult blood positive stool  Low risk for colonscopy and EGD    Hypertension  Controlled  ECHO given EKG with LVH          "

## 2023-11-21 DIAGNOSIS — R79.89 ELEVATED TSH: Primary | ICD-10-CM

## 2023-11-22 RX ORDER — LEVOTHYROXINE SODIUM 75 UG/1
75 TABLET ORAL DAILY
Qty: 90 TABLET | Refills: 1 | Status: SHIPPED | OUTPATIENT
Start: 2023-11-22 | End: 2024-11-21

## 2023-12-15 ENCOUNTER — HOSPITAL ENCOUNTER (OUTPATIENT)
Dept: RADIOLOGY | Facility: HOSPITAL | Age: 83
Discharge: HOME | End: 2023-12-15
Attending: STUDENT IN AN ORGANIZED HEALTH CARE EDUCATION/TRAINING PROGRAM
Payer: COMMERCIAL

## 2023-12-15 ENCOUNTER — HOSPITAL ENCOUNTER (OUTPATIENT)
Dept: CARDIOLOGY | Facility: HOSPITAL | Age: 83
Discharge: HOME | End: 2023-12-15
Attending: STUDENT IN AN ORGANIZED HEALTH CARE EDUCATION/TRAINING PROGRAM
Payer: COMMERCIAL

## 2023-12-15 VITALS — WEIGHT: 154 LBS | BODY MASS INDEX: 27.29 KG/M2 | HEIGHT: 63 IN

## 2023-12-15 VITALS
HEIGHT: 63 IN | HEART RATE: 67 BPM | SYSTOLIC BLOOD PRESSURE: 176 MMHG | BODY MASS INDEX: 27.28 KG/M2 | DIASTOLIC BLOOD PRESSURE: 71 MMHG

## 2023-12-15 DIAGNOSIS — R07.9 CHEST PAIN, UNSPECIFIED TYPE: ICD-10-CM

## 2023-12-15 PROCEDURE — 93306 TTE W/DOPPLER COMPLETE: CPT

## 2023-12-15 PROCEDURE — 93017 CV STRESS TEST TRACING ONLY: CPT

## 2023-12-15 PROCEDURE — 93016 NUCLEAR STRESS TEST (CUPID ONLY): ICD-10-PCS | Mod: ,,, | Performed by: NURSE PRACTITIONER

## 2023-12-15 PROCEDURE — A9500 TC99M SESTAMIBI: HCPCS

## 2023-12-15 PROCEDURE — 63600175 PHARM REV CODE 636 W HCPCS: Performed by: STUDENT IN AN ORGANIZED HEALTH CARE EDUCATION/TRAINING PROGRAM

## 2023-12-15 PROCEDURE — 78452 NM MYOCARDIAL PERFUSION SPECT MULTI PHARM: ICD-10-PCS | Mod: 26,,, | Performed by: STUDENT IN AN ORGANIZED HEALTH CARE EDUCATION/TRAINING PROGRAM

## 2023-12-15 PROCEDURE — 78452 HT MUSCLE IMAGE SPECT MULT: CPT | Mod: 26,,, | Performed by: STUDENT IN AN ORGANIZED HEALTH CARE EDUCATION/TRAINING PROGRAM

## 2023-12-15 PROCEDURE — 93306 ECHO (CUPID ONLY): ICD-10-PCS | Mod: 26,,, | Performed by: STUDENT IN AN ORGANIZED HEALTH CARE EDUCATION/TRAINING PROGRAM

## 2023-12-15 PROCEDURE — 93306 TTE W/DOPPLER COMPLETE: CPT | Mod: 26,,, | Performed by: STUDENT IN AN ORGANIZED HEALTH CARE EDUCATION/TRAINING PROGRAM

## 2023-12-15 PROCEDURE — 93016 CV STRESS TEST SUPVJ ONLY: CPT | Mod: ,,, | Performed by: NURSE PRACTITIONER

## 2023-12-15 PROCEDURE — 93018 NUCLEAR STRESS TEST (CUPID ONLY): ICD-10-PCS | Mod: ,,, | Performed by: STUDENT IN AN ORGANIZED HEALTH CARE EDUCATION/TRAINING PROGRAM

## 2023-12-15 PROCEDURE — 93018 CV STRESS TEST I&R ONLY: CPT | Mod: ,,, | Performed by: STUDENT IN AN ORGANIZED HEALTH CARE EDUCATION/TRAINING PROGRAM

## 2023-12-15 RX ORDER — REGADENOSON 0.08 MG/ML
0.4 INJECTION, SOLUTION INTRAVENOUS ONCE
Status: COMPLETED | OUTPATIENT
Start: 2023-12-15 | End: 2023-12-15

## 2023-12-15 RX ADMIN — REGADENOSON 0.4 MG: 0.08 INJECTION, SOLUTION INTRAVENOUS at 09:12

## 2023-12-18 LAB
AORTIC ROOT ANNULUS: 3.21 CM
AV INDEX (PROSTH): 0.86
AV MEAN GRADIENT: 3 MMHG
AV PEAK GRADIENT: 6 MMHG
AV REGURGITATION PRESSURE HALF TIME: 728.72 MS
AV VALVE AREA BY VELOCITY RATIO: 2.83 CM²
AV VALVE AREA: 2.83 CM²
AV VELOCITY RATIO: 0.86
BSA FOR ECHO PROCEDURE: 1.76 M2
CV ECHO LV RWT: 0.39 CM
DOP CALC AO PEAK VEL: 1.2 M/S
DOP CALC AO VTI: 29.1 CM
DOP CALC LVOT AREA: 3.3 CM2
DOP CALC LVOT DIAMETER: 2.05 CM
DOP CALC LVOT PEAK VEL: 1.03 M/S
DOP CALC LVOT STROKE VOLUME: 82.47 CM3
DOP CALCLVOT PEAK VEL VTI: 25 CM
E WAVE DECELERATION TIME: 182.91 MSEC
E/A RATIO: 1.38
E/E' RATIO: 10.71 M/S
ECHO LV POSTERIOR WALL: 0.79 CM (ref 0.6–1.1)
FRACTIONAL SHORTENING: 18 % (ref 28–44)
INTERVENTRICULAR SEPTUM: 1.01 CM (ref 0.6–1.1)
IVC DIAMETER: 0.95 CM
LEFT INTERNAL DIMENSION IN SYSTOLE: 3.32 CM (ref 2.1–4)
LEFT VENTRICLE DIASTOLIC VOLUME INDEX: 41.98 ML/M2
LEFT VENTRICLE DIASTOLIC VOLUME: 72.63 ML
LEFT VENTRICLE MASS INDEX: 65 G/M2
LEFT VENTRICLE SYSTOLIC VOLUME INDEX: 25.8 ML/M2
LEFT VENTRICLE SYSTOLIC VOLUME: 44.71 ML
LEFT VENTRICULAR INTERNAL DIMENSION IN DIASTOLE: 4.06 CM (ref 3.5–6)
LEFT VENTRICULAR MASS: 112.34 G
LV LATERAL E/E' RATIO: 10.11 M/S
LV SEPTAL E/E' RATIO: 11.38 M/S
LVOT MG: 2.06 MMHG
LVOT MV: 0.67 CM/S
MV PEAK A VEL: 0.66 M/S
MV PEAK E VEL: 0.91 M/S
OHS LV EJECTION FRACTION SIMPSONS BIPLANE MOD: 73 %
PISA AR MAX VEL: 4.55 M/S
PISA TR MAX VEL: 3.54 M/S
PV PEAK GRADIENT: 3 MMHG
PV PEAK VELOCITY: 0.9 M/S
RA PRESSURE ESTIMATED: 3 MMHG
RIGHT ATRIAL AREA: 11 CM2
RIGHT VENTRICULAR END-DIASTOLIC DIMENSION: 2.7 CM
RIGHT VENTRICULAR LENGTH IN DIASTOLE (APICAL 4-CHAMBER VIEW): 6.19 CM
RV MID DIAMA: 1 CM
RV TB RVSP: 7 MMHG
TDI LATERAL: 0.09 M/S
TDI SEPTAL: 0.08 M/S
TDI: 0.09 M/S
TR MAX PG: 50 MMHG
TRICUSPID ANNULAR PLANE SYSTOLIC EXCURSION: 1.9 CM
TV REST PULMONARY ARTERY PRESSURE: 53 MMHG
Z-SCORE OF LEFT VENTRICULAR DIMENSION IN END DIASTOLE: -1.66
Z-SCORE OF LEFT VENTRICULAR DIMENSION IN END SYSTOLE: 0.89

## 2023-12-20 NOTE — PROGRESS NOTES
Pt. Notified echo suggests a stiff heart likely due to hypertension stress test is normal per Dr. Driver. Pt. Voiced understanding.

## 2023-12-27 ENCOUNTER — ANESTHESIA (OUTPATIENT)
Dept: GASTROENTEROLOGY | Facility: HOSPITAL | Age: 83
End: 2023-12-27
Payer: COMMERCIAL

## 2023-12-27 ENCOUNTER — ANESTHESIA EVENT (OUTPATIENT)
Dept: GASTROENTEROLOGY | Facility: HOSPITAL | Age: 83
End: 2023-12-27
Payer: COMMERCIAL

## 2023-12-27 ENCOUNTER — HOSPITAL ENCOUNTER (OUTPATIENT)
Dept: GASTROENTEROLOGY | Facility: HOSPITAL | Age: 83
Discharge: HOME | End: 2023-12-27
Attending: NURSE PRACTITIONER
Payer: COMMERCIAL

## 2023-12-27 VITALS
BODY MASS INDEX: 27.29 KG/M2 | HEART RATE: 64 BPM | RESPIRATION RATE: 19 BRPM | DIASTOLIC BLOOD PRESSURE: 64 MMHG | WEIGHT: 154 LBS | HEART RATE: 76 BPM | OXYGEN SATURATION: 89 % | DIASTOLIC BLOOD PRESSURE: 55 MMHG | SYSTOLIC BLOOD PRESSURE: 140 MMHG | TEMPERATURE: 97 F | OXYGEN SATURATION: 99 % | SYSTOLIC BLOOD PRESSURE: 193 MMHG | HEIGHT: 63 IN

## 2023-12-27 DIAGNOSIS — R10.13 EPIGASTRIC PAIN: ICD-10-CM

## 2023-12-27 DIAGNOSIS — K21.00 GASTROESOPHAGEAL REFLUX DISEASE WITH ESOPHAGITIS WITHOUT HEMORRHAGE: Primary | ICD-10-CM

## 2023-12-27 DIAGNOSIS — R19.5 OCCULT BLOOD POSITIVE STOOL: ICD-10-CM

## 2023-12-27 DIAGNOSIS — K44.9 HH (HIATUS HERNIA): ICD-10-CM

## 2023-12-27 LAB — GLUCOSE SERPL-MCNC: 189 MG/DL (ref 70–105)

## 2023-12-27 PROCEDURE — 37000009 HC ANESTHESIA EA ADD 15 MINS

## 2023-12-27 PROCEDURE — 63600175 PHARM REV CODE 636 W HCPCS: Performed by: NURSE ANESTHETIST, CERTIFIED REGISTERED

## 2023-12-27 PROCEDURE — D9220A PRA ANESTHESIA: ICD-10-PCS | Mod: ,,, | Performed by: NURSE ANESTHETIST, CERTIFIED REGISTERED

## 2023-12-27 PROCEDURE — D9220A PRA ANESTHESIA: Mod: ,,, | Performed by: NURSE ANESTHETIST, CERTIFIED REGISTERED

## 2023-12-27 PROCEDURE — 88305 TISSUE EXAM BY PATHOLOGIST: CPT | Mod: 26,,, | Performed by: PATHOLOGY

## 2023-12-27 PROCEDURE — 82962 GLUCOSE BLOOD TEST: CPT

## 2023-12-27 PROCEDURE — 88305 TISSUE EXAM BY PATHOLOGIST: CPT | Mod: TC,59,SUR | Performed by: INTERNAL MEDICINE

## 2023-12-27 PROCEDURE — 88342 IMHCHEM/IMCYTCHM 1ST ANTB: CPT | Mod: 26,,, | Performed by: PATHOLOGY

## 2023-12-27 PROCEDURE — 27000716 HC OXISENSOR PROBE, ANY SIZE: Performed by: NURSE ANESTHETIST, CERTIFIED REGISTERED

## 2023-12-27 PROCEDURE — 88342 SURGICAL PATHOLOGY: ICD-10-PCS | Mod: 26,,, | Performed by: PATHOLOGY

## 2023-12-27 PROCEDURE — 88305 SURGICAL PATHOLOGY: ICD-10-PCS | Mod: 26,,, | Performed by: PATHOLOGY

## 2023-12-27 PROCEDURE — 43239 EGD BIOPSY SINGLE/MULTIPLE: CPT | Mod: ,,, | Performed by: INTERNAL MEDICINE

## 2023-12-27 PROCEDURE — 43239 PR EGD, FLEX, W/BIOPSY, SGL/MULTI: ICD-10-PCS | Mod: ,,, | Performed by: INTERNAL MEDICINE

## 2023-12-27 PROCEDURE — 27000284 HC CANNULA NASAL: Performed by: NURSE ANESTHETIST, CERTIFIED REGISTERED

## 2023-12-27 PROCEDURE — 27201423 OPTIME MED/SURG SUP & DEVICES STERILE SUPPLY

## 2023-12-27 PROCEDURE — 43239 EGD BIOPSY SINGLE/MULTIPLE: CPT | Performed by: INTERNAL MEDICINE

## 2023-12-27 PROCEDURE — 82962 GLUCOSE BLOOD TEST: CPT | Mod: 91

## 2023-12-27 PROCEDURE — 37000008 HC ANESTHESIA 1ST 15 MINUTES

## 2023-12-27 PROCEDURE — 25000003 PHARM REV CODE 250: Performed by: NURSE ANESTHETIST, CERTIFIED REGISTERED

## 2023-12-27 RX ORDER — PHENYLEPHRINE HYDROCHLORIDE 10 MG/ML
INJECTION INTRAVENOUS
Status: DISCONTINUED | OUTPATIENT
Start: 2023-12-27 | End: 2023-12-27

## 2023-12-27 RX ORDER — PANTOPRAZOLE SODIUM 40 MG/1
40 TABLET, DELAYED RELEASE ORAL DAILY
Qty: 30 TABLET | Refills: 2 | Status: SHIPPED | OUTPATIENT
Start: 2023-12-27 | End: 2024-03-26

## 2023-12-27 RX ORDER — PROPOFOL 10 MG/ML
VIAL (ML) INTRAVENOUS CONTINUOUS PRN
Status: DISCONTINUED | OUTPATIENT
Start: 2023-12-27 | End: 2023-12-27

## 2023-12-27 RX ORDER — SODIUM CHLORIDE 0.9 % (FLUSH) 0.9 %
10 SYRINGE (ML) INJECTION
Status: DISCONTINUED | OUTPATIENT
Start: 2023-12-27 | End: 2023-12-28 | Stop reason: HOSPADM

## 2023-12-27 RX ORDER — LIDOCAINE HYDROCHLORIDE 20 MG/ML
INJECTION, SOLUTION EPIDURAL; INFILTRATION; INTRACAUDAL; PERINEURAL
Status: DISCONTINUED | OUTPATIENT
Start: 2023-12-27 | End: 2023-12-27

## 2023-12-27 RX ADMIN — LIDOCAINE HYDROCHLORIDE 50 MG: 20 INJECTION, SOLUTION INTRAVENOUS at 10:12

## 2023-12-27 RX ADMIN — PHENYLEPHRINE HYDROCHLORIDE 50 MCG: 10 INJECTION INTRAVENOUS at 10:12

## 2023-12-27 RX ADMIN — SODIUM CHLORIDE: 9 INJECTION, SOLUTION INTRAVENOUS at 10:12

## 2023-12-27 RX ADMIN — PROPOFOL 200 MCG/KG/MIN: 10 INJECTION, EMULSION INTRAVENOUS at 10:12

## 2023-12-27 NOTE — ANESTHESIA POSTPROCEDURE EVALUATION
Anesthesia Post Evaluation    Patient: Opal Owens    Procedure(s) Performed: * No procedures listed *    Final Anesthesia Type: general      Patient location during evaluation: GI PACU  Patient participation: Yes- Able to Participate  Level of consciousness: awake and alert  Post-procedure vital signs: reviewed and stable  Pain management: adequate  Airway patency: patent    PONV status at discharge: No PONV  Anesthetic complications: no      Cardiovascular status: blood pressure returned to baseline and hemodynamically stable  Respiratory status: spontaneous ventilation  Hydration status: euvolemic  Follow-up not needed.  Comments: Refer to nursing notes for pain/travis score upon discharge from recovery.              Vitals Value Taken Time   /146 12/27/23 1055   Temp 36.1 °C (97 °F) 12/27/23 1031   Pulse 69 12/27/23 1055   Resp 15 12/27/23 1055   SpO2 98 % 12/27/23 1055   Vitals shown include unvalidated device data.      Event Time   Out of Recovery 11:15:00         Pain/Travis Score: Travis Score: 9 (12/27/2023 10:34 AM)

## 2023-12-27 NOTE — DISCHARGE INSTRUCTIONS
Procedure Date  12/27/23     Impression  Overall Impression:   Performed forceps biopsies in the esophagus and stomach  2 cm hiatal hernia  Esophagitis in the lower third of the esophagus; performed cold forceps biopsy  The distal esophageal mucosa was inflamed appearing; biopsies were obtained. A small hiatus hernia was noted. The stomach had no ulcers or erosions and biopsies were obtained to rule-out H.pylori. Mucosa of the duodenum was normal appearing.     Recommendation    Await pathology results      Avoid nsaids; PPI or H2RA daily.     Schedule (or keep appt for ) colonoscopy for blood in stool and history of colon polyps. - January 16, 2024 @ 1:30 pm    Discharge: disp: DC to home. Resume diet. No driving x 24 hours. F/U with PCP as scheduled.  Dx: reflux esophagitis, hiatus hernia, blood in stool, anemia    No driving today, no operating heavy machinery, no signing any legal documents until tomorrow.    Drink lots of fluids, resume regular diet.  Take your normal medications.     Please call our office for any nausea, vomiting or abdominal pain.

## 2023-12-27 NOTE — ANESTHESIA PREPROCEDURE EVALUATION
12/27/2023  Opal Owens is a 83 y.o., female.    Past Medical History:   Diagnosis Date    Breast cancer     CHF (congestive heart failure)     Coronary artery disease     Depression     Diabetes mellitus     High blood cholesterol     Hypertension     Parkinson's disease        Past Surgical History:   Procedure Laterality Date    HYSTERECTOMY      MASTECTOMY Right     OOPHORECTOMY         Family History   Problem Relation Age of Onset    Breast cancer Paternal Aunt        Social History     Socioeconomic History    Marital status:    Tobacco Use    Smoking status: Never    Smokeless tobacco: Never   Substance and Sexual Activity    Alcohol use: Never    Drug use: Never       Current Outpatient Medications   Medication Sig Dispense Refill    amLODIPine (NORVASC) 5 MG tablet Take 5 mg by mouth.      atorvastatin (LIPITOR) 20 MG tablet Take 20 mg by mouth.      carbidopa-levodopa  mg (SINEMET)  mg per tablet Take 1 tablet by mouth 3 (three) times daily.      FARXIGA 10 mg tablet Take 10 mg by mouth.      FLUoxetine 20 MG capsule Take 20 mg by mouth.      fluticasone propionate (FLONASE) 50 mcg/actuation nasal spray by Each Nostril route.      glipiZIDE (GLUCOTROL) 10 MG TR24 Take 10 mg by mouth 2 (two) times daily.      hydrocortisone 2.5 % ointment Apply topically 2 (two) times daily. For hemorrhoids for 5 days 30 g 0    levocetirizine (XYZAL) 5 MG tablet Take 5 mg by mouth.      levothyroxine (SYNTHROID) 75 MCG tablet Take 1 tablet (75 mcg total) by mouth Daily. 90 tablet 1    metFORMIN (GLUCOPHAGE) 500 MG tablet Take 1,000 mg by mouth 2 (two) times daily.      metoprolol succinate (TOPROL-XL) 50 MG 24 hr tablet Take 50 mg by mouth 2 (two) times daily.      pantoprazole (PROTONIX) 40 MG tablet Take 1 tablet (40 mg total) by mouth once daily. 30 tablet 2     No current  facility-administered medications for this encounter.       Review of patient's allergies indicates:   Allergen Reactions    Asa [aspirin] Itching    Latex, natural rubber Hives    Sulfa (sulfonamide antibiotics) Itching    Pcn [penicillins] Rash       Pre-op Assessment    I have reviewed the Patient Summary Reports.     I have reviewed the Nursing Notes. I have reviewed the NPO Status.   I have reviewed the Medications.     Review of Systems  Anesthesia Hx:  No problems with previous Anesthesia             Denies Family Hx of Anesthesia complications.    Denies Personal Hx of Anesthesia complications.                    Hematology/Oncology:    Oncology Normal                                   EENT/Dental:  EENT/Dental Normal           Cardiovascular:     Hypertension   CAD       CHF    hyperlipidemia                             Renal/:  Renal/ Normal                 Musculoskeletal:  Musculoskeletal Normal                Neurological:  Neurology Normal                                      Endocrine:  Diabetes, type 2           Dermatological:  Skin Normal    Psych:  Psychiatric History                  Physical Exam  General: Well nourished, Alert, Oriented and Cooperative    Airway:  Mouth Opening: Normal  Neck ROM: Normal ROM    Chest/Lungs:  Normal Respiratory Rate    Heart:  Rate: Normal        Anesthesia Plan  Type of Anesthesia, risks & benefits discussed:    Anesthesia Type: Gen Natural Airway, MAC  Intra-op Monitoring Plan: Standard ASA Monitors  Post Op Pain Control Plan: multimodal analgesia and IV/PO Opioids PRN  Induction:  IV  Informed Consent: Informed consent signed with the Patient and all parties understand the risks and agree with anesthesia plan.  All questions answered. Patient consented to blood products? Yes  ASA Score: 3  Day of Surgery Review of History & Physical: I have interviewed and examined the patient. I have reviewed the patient's H&P dated: There are no significant changes.      Ready For Surgery From Anesthesia Perspective.     .

## 2023-12-27 NOTE — TRANSFER OF CARE
"Anesthesia Transfer of Care Note    Patient: Opal Owens    Procedure(s) Performed: * No procedures listed *    Patient location: GI    Anesthesia Type: general    Transport from OR: Transported from OR on room air with adequate spontaneous ventilation. Continuous ECG monitoring in transport. Continuous SpO2 monitoring in transport    Post pain: adequate analgesia    Post assessment: no apparent anesthetic complications    Post vital signs: stable    Level of consciousness: sedated and responds to stimulation    Nausea/Vomiting: no nausea/vomiting    Complications: none    Transfer of care protocol was followedComments: Good SV continue, NAD, VSS, RTRN      Last vitals: Visit Vitals  BP (!) 140/55 (BP Location: Right arm, Patient Position: Lying)   Pulse 64   Temp 36.1 °C (97 °F) (Skin)   Resp 19   Ht 5' 3" (1.6 m)   Wt 69.9 kg (154 lb)   SpO2 100%   Breastfeeding No   BMI 27.28 kg/m²     "

## 2023-12-28 ENCOUNTER — TELEPHONE (OUTPATIENT)
Dept: GASTROENTEROLOGY | Facility: CLINIC | Age: 83
End: 2023-12-28
Payer: COMMERCIAL

## 2023-12-28 LAB
ESTROGEN SERPL-MCNC: NORMAL PG/ML
INSULIN SERPL-ACNC: NORMAL U[IU]/ML
LAB AP GROSS DESCRIPTION: NORMAL
LAB AP LABORATORY NOTES: NORMAL
T3RU NFR SERPL: NORMAL %

## 2023-12-28 NOTE — PROGRESS NOTES
EGD biopsies show gastritis and reflux esophagitis.  Recommend: avoid nsaids; continue protonix as needed.

## 2023-12-28 NOTE — TELEPHONE ENCOUNTER
Results and recommendations called to patient. Verbalized understanding.            ----- Message from Shane Grover MD sent at 12/28/2023  2:12 PM CST -----  EGD biopsies show gastritis and reflux esophagitis.  Recommend: avoid nsaids; continue protonix as needed.

## 2024-01-05 LAB
CV STRESS BASE HR: 67 BPM
DIASTOLIC BLOOD PRESSURE: 71 MMHG
OHS CV CPX 85 PERCENT MAX PREDICTED HEART RATE MALE: 118
OHS CV CPX MAX PREDICTED HEART RATE: 139
OHS CV CPX PATIENT IS FEMALE: 1
OHS CV CPX PATIENT IS MALE: 0
OHS CV CPX PEAK DIASTOLIC BLOOD PRESSURE: 71 MMHG
OHS CV CPX PEAK HEAR RATE: 82 BPM
OHS CV CPX PEAK RATE PRESSURE PRODUCT: NORMAL
OHS CV CPX PEAK SYSTOLIC BLOOD PRESSURE: 176 MMHG
OHS CV CPX PERCENT MAX PREDICTED HEART RATE ACHIEVED: 61
OHS CV CPX RATE PRESSURE PRODUCT PRESENTING: NORMAL
SYSTOLIC BLOOD PRESSURE: 176 MMHG

## 2024-03-04 ENCOUNTER — OFFICE VISIT (OUTPATIENT)
Dept: CARDIOLOGY | Facility: CLINIC | Age: 84
End: 2024-03-04
Payer: COMMERCIAL

## 2024-03-04 ENCOUNTER — TELEPHONE (OUTPATIENT)
Dept: GASTROENTEROLOGY | Facility: CLINIC | Age: 84
End: 2024-03-04
Payer: COMMERCIAL

## 2024-03-04 VITALS
SYSTOLIC BLOOD PRESSURE: 138 MMHG | HEIGHT: 63 IN | RESPIRATION RATE: 16 BRPM | DIASTOLIC BLOOD PRESSURE: 80 MMHG | HEART RATE: 72 BPM | BODY MASS INDEX: 28.35 KG/M2 | WEIGHT: 160 LBS

## 2024-03-04 DIAGNOSIS — D50.9 IRON DEFICIENCY ANEMIA, UNSPECIFIED IRON DEFICIENCY ANEMIA TYPE: Primary | ICD-10-CM

## 2024-03-04 DIAGNOSIS — K21.00 GASTROESOPHAGEAL REFLUX DISEASE WITH ESOPHAGITIS WITHOUT HEMORRHAGE: ICD-10-CM

## 2024-03-04 DIAGNOSIS — I10 HYPERTENSION, UNSPECIFIED TYPE: ICD-10-CM

## 2024-03-04 PROCEDURE — 3079F DIAST BP 80-89 MM HG: CPT | Mod: CPTII,,, | Performed by: STUDENT IN AN ORGANIZED HEALTH CARE EDUCATION/TRAINING PROGRAM

## 2024-03-04 PROCEDURE — 99214 OFFICE O/P EST MOD 30 MIN: CPT | Mod: S$PBB,,, | Performed by: STUDENT IN AN ORGANIZED HEALTH CARE EDUCATION/TRAINING PROGRAM

## 2024-03-04 PROCEDURE — 99214 OFFICE O/P EST MOD 30 MIN: CPT | Mod: PBBFAC | Performed by: STUDENT IN AN ORGANIZED HEALTH CARE EDUCATION/TRAINING PROGRAM

## 2024-03-04 PROCEDURE — 3075F SYST BP GE 130 - 139MM HG: CPT | Mod: CPTII,,, | Performed by: STUDENT IN AN ORGANIZED HEALTH CARE EDUCATION/TRAINING PROGRAM

## 2024-03-04 PROCEDURE — 1159F MED LIST DOCD IN RCRD: CPT | Mod: CPTII,,, | Performed by: STUDENT IN AN ORGANIZED HEALTH CARE EDUCATION/TRAINING PROGRAM

## 2024-03-04 NOTE — TELEPHONE ENCOUNTER
Left message on verified voicemail that iron levels were normal and to call back if any questions.            ----- Message from MELE Feliciano sent at 3/4/2024  3:10 PM CST -----  Iron studies are normal.

## 2024-03-04 NOTE — PROGRESS NOTES
PCP: Ingrid Sharp MD    Referring Provider:     Subjective:   Opal Owens is a 83 y.o. female with hx of DM, Hypothyroid, FOBT +ve, HTN who presents for evaluation of HTN and chest pain.     3/4/24 - Patient report chronic tongue paresthesia and left arm arthritic pain. She had an EGD with chronic gastritis. Did not get colonoscopy. She appears to be pale today .    11/2023 - Patient recently seen by GI for epigastric pain. Nexium was switched to protonix. Reports minimal improvement. Reports mid chest pain (could not describe). Worse with constipation. Taking miralax. Last for few mins. Occurs every day.     Fhx: non-contributary  Shx: Denies smoking, etoh or drug use    EKG - 11/20/23 - NSR, GLORIA, LVH with ST abnl.  ECHO Results for orders placed during the hospital encounter of 12/15/23    Echo    Interpretation Summary    Left Ventricle: The left ventricle is normal in size. Normal wall thickness. Normal wall motion. There is hyperdynamic systolic function with a visually estimated ejection fraction of greater than 70%. Biplane (2D) method of discs ejection fraction is 73%.    Right Ventricle: Normal right ventricular cavity size. Systolic function is normal.    Left Atrium: Left atrium is severely dilated. 27.54cm2    Right Atrium: Right atrium is dilated.    Aortic Valve: The aortic valve is a trileaflet valve. There is mild to moderate aortic regurgitation.    Mitral Valve: There is mild annular dilation present. There is moderate regurgitation.    Tricuspid Valve: There is moderate regurgitation.    Pulmonary Artery: The estimated pulmonary artery systolic pressure is 53 mmHg.    IVC/SVC: Normal venous pressure at 3 mmHg.    Lexiscan 12/2024  1.  Scintigraphically negative for ischemia or infarct.  2. the global left ventricular systolic function is normal with an LV ejection fraction of 85 % and no evidence of LV dilatation. Wall motion is normal.          Lab Results   Component Value Date      10/19/2023    K 3.2 (L) 10/19/2023     10/19/2023    CO2 27 10/19/2023    BUN 18 10/19/2023    CREATININE 1.07 (H) 10/19/2023    CALCIUM 9.0 10/19/2023    ANIONGAP 13 10/19/2023    ESTGFRAFRICA 79 01/18/2021    EGFRNONAA 59 (L) 05/23/2022       Lab Results   Component Value Date    CHOL 131 10/19/2021    CHOL 229 (H) 07/20/2021    CHOL 174 05/17/2021     Lab Results   Component Value Date    HDL 62 (H) 10/19/2021    HDL 64 (H) 07/20/2021    HDL 61 (H) 05/17/2021     Lab Results   Component Value Date    LDLCALC 47 10/19/2021    LDLCALC 141 07/20/2021    LDLCALC 93 05/17/2021     Lab Results   Component Value Date    TRIG 111 10/19/2021    TRIG 120 07/20/2021    TRIG 98 05/17/2021     Lab Results   Component Value Date    CHOLHDL 2.1 10/19/2021    CHOLHDL 3.6 07/20/2021    CHOLHDL 2.9 05/17/2021       Lab Results   Component Value Date    WBC 11.19 (H) 10/19/2023    HGB 11.0 (L) 10/19/2023    HCT 33.5 (L) 10/19/2023    MCV 92.5 10/19/2023     10/19/2023           Current Outpatient Medications:     amLODIPine (NORVASC) 5 MG tablet, Take 5 mg by mouth., Disp: , Rfl:     atorvastatin (LIPITOR) 20 MG tablet, Take 20 mg by mouth., Disp: , Rfl:     FLUoxetine 20 MG capsule, Take 20 mg by mouth., Disp: , Rfl:     glipiZIDE (GLUCOTROL) 10 MG TR24, Take 10 mg by mouth 2 (two) times daily., Disp: , Rfl:     levothyroxine (SYNTHROID) 75 MCG tablet, Take 1 tablet (75 mcg total) by mouth Daily., Disp: 90 tablet, Rfl: 1    metFORMIN (GLUCOPHAGE) 500 MG tablet, Take 1,000 mg by mouth 2 (two) times daily., Disp: , Rfl:     metoprolol succinate (TOPROL-XL) 50 MG 24 hr tablet, Take 50 mg by mouth 2 (two) times daily., Disp: , Rfl:     pantoprazole (PROTONIX) 40 MG tablet, Take 1 tablet (40 mg total) by mouth once daily., Disp: 30 tablet, Rfl: 2    carbidopa-levodopa  mg (SINEMET)  mg per tablet, Take 1 tablet by mouth 3 (three) times daily., Disp: , Rfl:     FARXIGA 10 mg tablet, Take 10 mg by mouth., Disp: ,  "Rfl:     fluticasone propionate (FLONASE) 50 mcg/actuation nasal spray, by Each Nostril route., Disp: , Rfl:     hydrocortisone 2.5 % ointment, Apply topically 2 (two) times daily. For hemorrhoids for 5 days, Disp: 30 g, Rfl: 0    levocetirizine (XYZAL) 5 MG tablet, Take 5 mg by mouth., Disp: , Rfl:     Review of Systems   Respiratory:  Negative for cough and shortness of breath.    Cardiovascular:  Negative for chest pain, palpitations, orthopnea, claudication, leg swelling and PND.         Objective:   /80   Pulse 72   Resp 16   Ht 5' 3" (1.6 m)   Wt 72.6 kg (160 lb)   BMI 28.34 kg/m²     Physical Exam  Constitutional:       Appearance: Normal appearance.   Cardiovascular:      Rate and Rhythm: Normal rate and regular rhythm.      Pulses: Normal pulses.      Heart sounds: Normal heart sounds. No murmur heard.  Pulmonary:      Effort: Pulmonary effort is normal.      Breath sounds: Normal breath sounds.   Musculoskeletal:         General: No swelling.   Neurological:      Mental Status: She is alert and oriented to person, place, and time.           Assessment:     1. Iron deficiency anemia, unspecified iron deficiency anemia type  CBC Auto Differential      2. Hypertension, unspecified type        3. Gastroesophageal reflux disease with esophagitis without hemorrhage              Plan:   Hypertension  Controlled  ECHO with hyperdynamic function    Gastroesophageal reflux disease with esophagitis without hemorrhage  Check anemia labs   And repeat CBC    - Lexiscan normal        "

## 2024-08-21 ENCOUNTER — HOSPITAL ENCOUNTER (OUTPATIENT)
Dept: RADIOLOGY | Facility: HOSPITAL | Age: 84
Discharge: HOME | End: 2024-08-21
Attending: RADIOLOGY
Payer: COMMERCIAL

## 2024-08-21 DIAGNOSIS — Z12.31 VISIT FOR SCREENING MAMMOGRAM: ICD-10-CM

## 2024-08-21 PROCEDURE — 77063 BREAST TOMOSYNTHESIS BI: CPT | Mod: 26,52,, | Performed by: RADIOLOGY

## 2024-08-21 PROCEDURE — 77067 SCR MAMMO BI INCL CAD: CPT | Mod: 26,52,, | Performed by: RADIOLOGY

## 2024-08-21 PROCEDURE — 77067 SCR MAMMO BI INCL CAD: CPT | Mod: TC,52

## 2025-05-29 ENCOUNTER — OFFICE VISIT (OUTPATIENT)
Dept: OTOLARYNGOLOGY | Facility: CLINIC | Age: 85
End: 2025-05-29
Payer: MEDICARE

## 2025-05-29 ENCOUNTER — CLINICAL SUPPORT (OUTPATIENT)
Dept: AUDIOLOGY | Facility: CLINIC | Age: 85
End: 2025-05-29
Payer: MEDICARE

## 2025-05-29 VITALS — HEIGHT: 63 IN | WEIGHT: 160 LBS | BODY MASS INDEX: 28.35 KG/M2

## 2025-05-29 DIAGNOSIS — H90.3 SENSORINEURAL HEARING LOSS (SNHL) OF BOTH EARS: Primary | ICD-10-CM

## 2025-05-29 DIAGNOSIS — H90.3 SENSORINEURAL HEARING LOSS, BILATERAL: Primary | ICD-10-CM

## 2025-05-29 PROCEDURE — 92557 COMPREHENSIVE HEARING TEST: CPT | Mod: PBBFAC | Performed by: AUDIOLOGIST

## 2025-05-29 PROCEDURE — 99999 PR PBB SHADOW E&M-EST. PATIENT-LVL II: CPT | Mod: PBBFAC,,, | Performed by: AUDIOLOGIST

## 2025-05-29 PROCEDURE — 1160F RVW MEDS BY RX/DR IN RCRD: CPT | Mod: CPTII,,, | Performed by: OTOLARYNGOLOGY

## 2025-05-29 PROCEDURE — 99212 OFFICE O/P EST SF 10 MIN: CPT | Mod: PBBFAC | Performed by: AUDIOLOGIST

## 2025-05-29 PROCEDURE — 99214 OFFICE O/P EST MOD 30 MIN: CPT | Mod: PBBFAC | Performed by: OTOLARYNGOLOGY

## 2025-05-29 PROCEDURE — 99204 OFFICE O/P NEW MOD 45 MIN: CPT | Mod: S$PBB,,, | Performed by: OTOLARYNGOLOGY

## 2025-05-29 PROCEDURE — 1159F MED LIST DOCD IN RCRD: CPT | Mod: CPTII,,, | Performed by: OTOLARYNGOLOGY

## 2025-05-29 PROCEDURE — 99999 PR PBB SHADOW E&M-EST. PATIENT-LVL IV: CPT | Mod: PBBFAC,,, | Performed by: OTOLARYNGOLOGY

## 2025-05-29 NOTE — PROGRESS NOTES
Subjective:       Patient ID: Opal Owens is a 84 y.o. female.    Chief Complaint: Hearing Loss (Bilateral hearing loss. Hearing test done. )    HPI  Review of Systems   HENT:  Positive for hearing loss.    All other systems reviewed and are negative.      Objective:      Physical Exam  General: NAD  Head: Normocephalic, atraumatic, no facial asymmetry/normal strength,  Ears: Both auricules normal in appearance, w/o deformities tympanic membranes normal external auditory canals normal  Nose: External nose w/o deformities normal turbinates no drainage or inflammation  Oral Cavity: Lips, gums, floor of mouth, tongue hard palate, and buccal mucosa without mass/lesion  Oropharynx: Mucosa pink and moist, soft palate, posterior pharynx and oropharyngeal wall without mass/lesion  Neck: Supple, symmetric, trachea midline, no palpable mass/lesion, no palpable cervical lymphadenopathy  Skin: Warm and dry, no concerning lesions  Respiratory: Respirations even, unlabored    Assessment:       1. Sensorineural hearing loss (SNHL) of both ears        Plan:       Audio reviewed and interpreted in detail rec hearing aids

## 2025-05-29 NOTE — PROGRESS NOTES
"  unoccluded EACs; WNL sloping to severe SNHL AU; VERBAL ("YES") RESPONSE; RECOMMEND ANNUAL AUDIO. EVAL. (SCHEDULED FOR 27 MAY 2026 @ 1300) (SOONER IF NEEDED) & HAs    "

## 2025-07-30 DIAGNOSIS — I73.9 PVD (PERIPHERAL VASCULAR DISEASE): Primary | ICD-10-CM

## 2025-09-04 ENCOUNTER — INITIAL CONSULT (OUTPATIENT)
Dept: VASCULAR SURGERY | Facility: CLINIC | Age: 85
End: 2025-09-04
Payer: MEDICARE

## 2025-09-04 VITALS — SYSTOLIC BLOOD PRESSURE: 126 MMHG | OXYGEN SATURATION: 99 % | HEART RATE: 64 BPM | DIASTOLIC BLOOD PRESSURE: 50 MMHG

## 2025-09-04 DIAGNOSIS — I73.9 PVD (PERIPHERAL VASCULAR DISEASE): Primary | ICD-10-CM

## 2025-09-04 PROCEDURE — 99213 OFFICE O/P EST LOW 20 MIN: CPT | Mod: PBBFAC | Performed by: SURGERY

## 2025-09-04 PROCEDURE — 99203 OFFICE O/P NEW LOW 30 MIN: CPT | Mod: S$PBB,,, | Performed by: SURGERY

## 2025-09-04 PROCEDURE — 99999 PR PBB SHADOW E&M-EST. PATIENT-LVL III: CPT | Mod: PBBFAC,,, | Performed by: SURGERY

## 2025-09-04 RX ORDER — METOPROLOL SUCCINATE 100 MG/1
100 TABLET, EXTENDED RELEASE ORAL DAILY
COMMUNITY

## 2025-09-04 RX ORDER — NEOMYCIN SULFATE, POLYMYXIN B SULFATE AND DEXAMETHASONE 3.5; 10000; 1 MG/ML; [USP'U]/ML; MG/ML
SUSPENSION/ DROPS OPHTHALMIC
COMMUNITY

## 2025-09-04 RX ORDER — HYDROCHLOROTHIAZIDE 12.5 MG/1
1 TABLET ORAL DAILY
COMMUNITY

## 2025-09-04 RX ORDER — MOMETASONE FUROATE MONOHYDRATE 50 UG/1
SPRAY, METERED NASAL
COMMUNITY
Start: 2023-10-12

## 2025-09-04 RX ORDER — HYDROCODONE BITARTRATE AND ACETAMINOPHEN 5; 325 MG/1; MG/1
1 TABLET ORAL
COMMUNITY

## 2025-09-04 RX ORDER — OLOPATADINE HYDROCHLORIDE 1 MG/ML
SOLUTION OPHTHALMIC
COMMUNITY

## 2025-09-04 RX ORDER — BRIMONIDINE TARTRATE 2 MG/ML
SOLUTION/ DROPS OPHTHALMIC
COMMUNITY

## 2025-09-04 RX ORDER — AMLODIPINE BESYLATE 10 MG/1
10 TABLET ORAL DAILY
COMMUNITY

## 2025-09-04 RX ORDER — FLUOROMETHOLONE 1 MG/ML
SUSPENSION/ DROPS OPHTHALMIC
COMMUNITY

## 2025-09-04 RX ORDER — METHYLPREDNISOLONE ACETATE 20 MG/ML
INJECTION, SUSPENSION INTRALESIONAL; INTRAMUSCULAR; INTRASYNOVIAL; SOFT TISSUE
COMMUNITY
Start: 2024-07-11

## 2025-09-04 RX ORDER — GLIMEPIRIDE 2 MG/1
TABLET ORAL
COMMUNITY
Start: 2023-12-05

## 2025-09-04 RX ORDER — ESOMEPRAZOLE MAGNESIUM 40 MG/1
CAPSULE, DELAYED RELEASE ORAL
COMMUNITY

## 2025-09-04 RX ORDER — LATANOPROST 50 UG/ML
SOLUTION/ DROPS OPHTHALMIC
COMMUNITY
Start: 2025-07-08